# Patient Record
Sex: MALE | Race: BLACK OR AFRICAN AMERICAN | Employment: FULL TIME | URBAN - METROPOLITAN AREA
[De-identification: names, ages, dates, MRNs, and addresses within clinical notes are randomized per-mention and may not be internally consistent; named-entity substitution may affect disease eponyms.]

---

## 2018-07-15 ENCOUNTER — HOSPITAL ENCOUNTER (EMERGENCY)
Age: 23
Discharge: HOME | End: 2018-07-15
Attending: EMERGENCY MEDICINE
Payer: COMMERCIAL

## 2018-07-15 VITALS
SYSTOLIC BLOOD PRESSURE: 140 MMHG | DIASTOLIC BLOOD PRESSURE: 79 MMHG | BODY MASS INDEX: 37.24 KG/M2 | HEART RATE: 81 BPM | HEIGHT: 73 IN | TEMPERATURE: 97.6 F | WEIGHT: 281 LBS | OXYGEN SATURATION: 100 %

## 2018-07-15 DIAGNOSIS — B20 HUMAN IMMUNODEFICIENCY VIRUS (HIV) DISEASE (HCC): ICD-10-CM

## 2018-07-15 DIAGNOSIS — R19.7 DIARRHEA, UNSPECIFIED TYPE: Primary | ICD-10-CM

## 2018-07-15 DIAGNOSIS — G43.809 OTHER MIGRAINE WITHOUT STATUS MIGRAINOSUS, NOT INTRACTABLE: ICD-10-CM

## 2018-07-15 PROCEDURE — 99283 EMERGENCY DEPT VISIT LOW MDM: CPT

## 2018-07-15 PROCEDURE — 6370000000 HC RX 637 (ALT 250 FOR IP): Performed by: EMERGENCY MEDICINE

## 2018-07-15 RX ORDER — BUTALBITAL, ACETAMINOPHEN AND CAFFEINE 50; 325; 40 MG/1; MG/1; MG/1
1 TABLET ORAL ONCE
Status: COMPLETED | OUTPATIENT
Start: 2018-07-15 | End: 2018-07-15

## 2018-07-15 RX ADMIN — BUTALBITAL, ACETAMINOPHEN, AND CAFFEINE 1 TABLET: 50; 325; 40 TABLET ORAL at 09:10

## 2018-07-15 ASSESSMENT — PAIN SCALES - GENERAL
PAINLEVEL_OUTOF10: 4
PAINLEVEL_OUTOF10: 4

## 2018-07-15 ASSESSMENT — PAIN DESCRIPTION - LOCATION: LOCATION: HEAD

## 2018-07-15 ASSESSMENT — PAIN DESCRIPTION - PAIN TYPE: TYPE: ACUTE PAIN

## 2018-07-15 ASSESSMENT — PAIN DESCRIPTION - DESCRIPTORS: DESCRIPTORS: PRESSURE

## 2018-07-15 NOTE — ED NOTES
Patient given d/c instructions with return verbalization including medication. Emphasis on f/u, to return with worsening s/s. Pt ambulated to lobby with steady gait. Work note given to patient.      Mikhail Carlton RN  07/15/18 6091

## 2019-12-16 ENCOUNTER — HOSPITAL ENCOUNTER (EMERGENCY)
Age: 24
Discharge: HOME | End: 2019-12-16
Attending: EMERGENCY MEDICINE
Payer: COMMERCIAL

## 2019-12-16 ENCOUNTER — APPOINTMENT (OUTPATIENT)
Dept: GENERAL RADIOLOGY | Age: 24
End: 2019-12-16
Payer: COMMERCIAL

## 2019-12-16 VITALS
BODY MASS INDEX: 36.45 KG/M2 | WEIGHT: 275 LBS | HEART RATE: 96 BPM | SYSTOLIC BLOOD PRESSURE: 148 MMHG | RESPIRATION RATE: 16 BRPM | OXYGEN SATURATION: 99 % | HEIGHT: 73 IN | DIASTOLIC BLOOD PRESSURE: 91 MMHG | TEMPERATURE: 98.5 F

## 2019-12-16 DIAGNOSIS — J06.9 VIRAL URI WITH COUGH: ICD-10-CM

## 2019-12-16 DIAGNOSIS — R05.9 COUGH: Primary | ICD-10-CM

## 2019-12-16 PROCEDURE — 99283 EMERGENCY DEPT VISIT LOW MDM: CPT

## 2019-12-16 PROCEDURE — 71046 X-RAY EXAM CHEST 2 VIEWS: CPT

## 2019-12-16 RX ORDER — IBUPROFEN 600 MG/1
600 TABLET ORAL EVERY 6 HOURS PRN
Qty: 30 TABLET | Refills: 0 | Status: SHIPPED | OUTPATIENT
Start: 2019-12-16 | End: 2020-01-18

## 2019-12-16 RX ORDER — GUAIFENESIN AND DEXTROMETHORPHAN HYDROBROMIDE 1200; 60 MG/1; MG/1
1 TABLET, EXTENDED RELEASE ORAL EVERY 12 HOURS PRN
Qty: 20 TABLET | Refills: 0 | Status: SHIPPED | OUTPATIENT
Start: 2019-12-16 | End: 2021-05-04

## 2019-12-16 ASSESSMENT — PAIN DESCRIPTION - DESCRIPTORS: DESCRIPTORS: ACHING

## 2019-12-16 ASSESSMENT — PAIN SCALES - GENERAL
PAINLEVEL_OUTOF10: 4
PAINLEVEL_OUTOF10: 0

## 2019-12-16 ASSESSMENT — PAIN DESCRIPTION - PAIN TYPE: TYPE: ACUTE PAIN

## 2019-12-16 ASSESSMENT — PAIN DESCRIPTION - LOCATION: LOCATION: CHEST

## 2020-01-18 ENCOUNTER — HOSPITAL ENCOUNTER (EMERGENCY)
Age: 25
Discharge: HOME | End: 2020-01-18
Attending: EMERGENCY MEDICINE
Payer: COMMERCIAL

## 2020-01-18 VITALS
DIASTOLIC BLOOD PRESSURE: 83 MMHG | HEART RATE: 84 BPM | HEIGHT: 73 IN | BODY MASS INDEX: 35.2 KG/M2 | TEMPERATURE: 98.1 F | SYSTOLIC BLOOD PRESSURE: 138 MMHG | RESPIRATION RATE: 18 BRPM | WEIGHT: 265.56 LBS

## 2020-01-18 LAB — D DIMER: <200 NG/ML DDU (ref 0–229)

## 2020-01-18 PROCEDURE — 85379 FIBRIN DEGRADATION QUANT: CPT

## 2020-01-18 PROCEDURE — 99283 EMERGENCY DEPT VISIT LOW MDM: CPT

## 2020-01-18 RX ORDER — AMLODIPINE BESYLATE 10 MG/1
10 TABLET ORAL
COMMUNITY
Start: 2018-02-12

## 2020-01-18 RX ORDER — HYDROCHLOROTHIAZIDE 12.5 MG/1
12.5 CAPSULE, GELATIN COATED ORAL
COMMUNITY
Start: 2018-03-14

## 2020-01-18 RX ORDER — METFORMIN HYDROCHLORIDE 500 MG/1
TABLET, EXTENDED RELEASE ORAL
COMMUNITY
Start: 2019-08-01

## 2020-01-18 RX ORDER — IBUPROFEN 600 MG/1
600 TABLET ORAL EVERY 6 HOURS PRN
Qty: 30 TABLET | Refills: 0 | Status: SHIPPED | OUTPATIENT
Start: 2020-01-18 | End: 2021-05-04

## 2020-01-18 RX ORDER — LANOLIN ALCOHOL/MO/W.PET/CERES
3 CREAM (GRAM) TOPICAL
COMMUNITY
Start: 2019-07-23

## 2020-01-18 ASSESSMENT — PAIN SCALES - GENERAL: PAINLEVEL_OUTOF10: 4

## 2020-01-18 ASSESSMENT — PAIN DESCRIPTION - LOCATION: LOCATION: LEG

## 2020-01-18 ASSESSMENT — PAIN DESCRIPTION - ORIENTATION: ORIENTATION: RIGHT

## 2020-01-18 ASSESSMENT — PAIN DESCRIPTION - PAIN TYPE: TYPE: ACUTE PAIN

## 2020-01-18 ASSESSMENT — PAIN DESCRIPTION - DESCRIPTORS: DESCRIPTORS: ACHING

## 2020-01-18 NOTE — ED PROVIDER NOTES
needed (for cough) 20 tablet 0    hydrocortisone 2.5 % cream Apply topically 2 times daily. 30 g 1       Social History     Tobacco Use    Smoking status: Never Smoker    Smokeless tobacco: Never Used   Substance Use Topics    Alcohol use: Yes     Comment: rarely    Drug use: No         EXAM:   Presentation Vital Signs: /83   Pulse 84   Temp 98.1 °F (36.7 °C)   Resp 18   Ht 6' 1\" (1.854 m)   Wt 120.5 kg (265 lb 9 oz)   BMI 35.04 kg/m²   General: Well nourished, no acute distress  Head: No traumatic injury  ENT: MMM, no facial asymmetry, no nasal discharge  Eyes: EOM  Neck: No tracheal deviation or stridor  Skin: no pallor, erythema, lesions or other abnormalities on exposed skin of face and arms  Extremities: L calf and R medial inner thigh tthere is pain with palpation  Neurologic: Alert, oriented x 3. No focal deficits upon moving arms and legs  Psychiatric: Appropriate demeanor without agitation or internal stimulation      MEDICAL DECISION MAKING  Pt here with isolated R LLE pain, spontaneous. Low risk for DVT but certainly signs and symptoms to some extent. D-dimer neg    Dc home with analgesics. States leg hurts more wearing a certain pair of shoes--perhaps he has some muscle strain from poor posture. Images reviewed, patient counseled, and knows to follow up with orthopedics in 1 week or less, especially if symptoms persist, and to follow up sooner if they worsen. DISPOSITION  Home    IMPRESSION:  R leg strain      This medical chart used with aid of transcription software. As such, there may be inadvertent errors in transcription of spellings and words despite physician's attempts to correct all possible errors.          Jeanmarie Concepcion MD  01/18/20 1830

## 2020-05-16 ENCOUNTER — HOSPITAL ENCOUNTER (EMERGENCY)
Age: 25
Discharge: HOME | End: 2020-05-16
Attending: EMERGENCY MEDICINE
Payer: COMMERCIAL

## 2020-05-16 VITALS
RESPIRATION RATE: 16 BRPM | SYSTOLIC BLOOD PRESSURE: 140 MMHG | HEART RATE: 67 BPM | BODY MASS INDEX: 37.52 KG/M2 | WEIGHT: 284.39 LBS | OXYGEN SATURATION: 96 % | TEMPERATURE: 98.5 F | DIASTOLIC BLOOD PRESSURE: 68 MMHG

## 2020-05-16 LAB
A/G RATIO: 1.4 (ref 1.1–2.2)
ALBUMIN SERPL-MCNC: 4.5 G/DL (ref 3.4–5)
ALP BLD-CCNC: 64 U/L (ref 40–129)
ALT SERPL-CCNC: 39 U/L (ref 10–40)
ANION GAP SERPL CALCULATED.3IONS-SCNC: 13 MMOL/L (ref 3–16)
AST SERPL-CCNC: 20 U/L (ref 15–37)
BASOPHILS ABSOLUTE: 0.1 K/UL (ref 0–0.2)
BASOPHILS RELATIVE PERCENT: 0.6 %
BILIRUB SERPL-MCNC: <0.2 MG/DL (ref 0–1)
BUN BLDV-MCNC: 14 MG/DL (ref 7–20)
CALCIUM SERPL-MCNC: 9.5 MG/DL (ref 8.3–10.6)
CHLORIDE BLD-SCNC: 98 MMOL/L (ref 99–110)
CO2: 25 MMOL/L (ref 21–32)
CREAT SERPL-MCNC: 1.1 MG/DL (ref 0.9–1.3)
EOSINOPHILS ABSOLUTE: 0 K/UL (ref 0–0.6)
EOSINOPHILS RELATIVE PERCENT: 0.2 %
GFR AFRICAN AMERICAN: >60
GFR NON-AFRICAN AMERICAN: >60
GLOBULIN: 3.2 G/DL
GLUCOSE BLD-MCNC: 237 MG/DL (ref 70–99)
HCT VFR BLD CALC: 44.9 % (ref 40.5–52.5)
HEMOGLOBIN: 14.8 G/DL (ref 13.5–17.5)
LYMPHOCYTES ABSOLUTE: 2.1 K/UL (ref 1–5.1)
LYMPHOCYTES RELATIVE PERCENT: 26.3 %
MAGNESIUM: 1.7 MG/DL (ref 1.8–2.4)
MCH RBC QN AUTO: 26.3 PG (ref 26–34)
MCHC RBC AUTO-ENTMCNC: 32.9 G/DL (ref 31–36)
MCV RBC AUTO: 79.9 FL (ref 80–100)
MONOCYTES ABSOLUTE: 0.5 K/UL (ref 0–1.3)
MONOCYTES RELATIVE PERCENT: 6.8 %
NEUTROPHILS ABSOLUTE: 5.2 K/UL (ref 1.7–7.7)
NEUTROPHILS RELATIVE PERCENT: 66.1 %
PDW BLD-RTO: 13.6 % (ref 12.4–15.4)
PLATELET # BLD: 274 K/UL (ref 135–450)
PMV BLD AUTO: 7.9 FL (ref 5–10.5)
POTASSIUM REFLEX MAGNESIUM: 3.1 MMOL/L (ref 3.5–5.1)
RBC # BLD: 5.63 M/UL (ref 4.2–5.9)
SODIUM BLD-SCNC: 136 MMOL/L (ref 136–145)
TOTAL PROTEIN: 7.7 G/DL (ref 6.4–8.2)
WBC # BLD: 7.9 K/UL (ref 4–11)

## 2020-05-16 PROCEDURE — 83735 ASSAY OF MAGNESIUM: CPT

## 2020-05-16 PROCEDURE — 85025 COMPLETE CBC W/AUTO DIFF WBC: CPT

## 2020-05-16 PROCEDURE — 80053 COMPREHEN METABOLIC PANEL: CPT

## 2020-05-16 PROCEDURE — 6370000000 HC RX 637 (ALT 250 FOR IP): Performed by: EMERGENCY MEDICINE

## 2020-05-16 PROCEDURE — 99283 EMERGENCY DEPT VISIT LOW MDM: CPT

## 2020-05-16 RX ORDER — ONDANSETRON 4 MG/1
4 TABLET, ORALLY DISINTEGRATING ORAL ONCE
Status: COMPLETED | OUTPATIENT
Start: 2020-05-16 | End: 2020-05-16

## 2020-05-16 RX ADMIN — ONDANSETRON 4 MG: 4 TABLET, ORALLY DISINTEGRATING ORAL at 04:08

## 2020-05-16 NOTE — ED NOTES
Pt state that he took three edibles a couple hours prior to arrival. He comes in stating that he has cotton mouth and is lightheaded. He states that he just doesn't feel like he is in his body. He denies pain. He is alert and orientedx4.       Cyn Goode RN  05/16/20 8913

## 2020-05-16 NOTE — ED PROVIDER NOTES
emtricitabine-rilpivirine-tenofovir alafenamide (ODEFSEY) 417-86-82 MG TABS per tablet Take 1 tablet by mouth daily         ALLERGIES    No Known Allergies    FAMILY HISTORY    History reviewed. No pertinent family history.     SOCIAL HISTORY    Social History     Socioeconomic History    Marital status: Single     Spouse name: None    Number of children: None    Years of education: None    Highest education level: None   Occupational History    None   Social Needs    Financial resource strain: None    Food insecurity     Worry: None     Inability: None    Transportation needs     Medical: None     Non-medical: None   Tobacco Use    Smoking status: Never Smoker    Smokeless tobacco: Never Used   Substance and Sexual Activity    Alcohol use: Yes     Comment: rarely    Drug use: Yes     Types: Marijuana     Comment: once on 5/16/2020    Sexual activity: Never   Lifestyle    Physical activity     Days per week: None     Minutes per session: None    Stress: None   Relationships    Social connections     Talks on phone: None     Gets together: None     Attends Gnosticist service: None     Active member of club or organization: None     Attends meetings of clubs or organizations: None     Relationship status: None    Intimate partner violence     Fear of current or ex partner: None     Emotionally abused: None     Physically abused: None     Forced sexual activity: None   Other Topics Concern    None   Social History Narrative    None       REVIEW OF SYSTEMS    Constitutional:  Denies fever, chills, weight loss or weakness   Eyes:  Denies photophobia or discharge   HENT:  Denies sore throat or ear pain   Respiratory:  Denies cough or shortness of breath   Cardiovascular:  Denies chest pain, palpitations or swelling   GI:  Denies abdominal pain, but he had some nausea and vomiting at home musculoskeletal:  Denies back pain   Skin:  Denies rash   Neurologic:  Denies headache, focal weakness or sensory changes   Endocrine:  Denies polyuria or polydypsia   Lymphatic:  Denies swollen glands   Psychiatric:  Denies depression, suicidal ideation or homicidal ideation   All systems negative except as marked. PHYSICAL EXAM    VITAL SIGNS: /70   Pulse 104   Temp 98.5 °F (36.9 °C) (Oral)   Resp 16   Wt 284 lb 6.3 oz (129 kg)   SpO2 96%   BMI 37.52 kg/m²    Constitutional:  Well developed, Well nourished, No acute distress, Non-toxic appearance. HENT:  Normocephalic, Atraumatic, Bilateral external ears normal, Oropharynx moist, No oral exudates, Nose normal. Neck- Normal range of motion, No tenderness, Supple, No stridor. Eyes:  PERRL, EOMI, Conjunctiva normal, No discharge. Respiratory:  Normal breath sounds, No respiratory distress, No wheezing, No chest tenderness. Cardiovascular:  Normal heart rate, Normal rhythm, No murmurs, No rubs, No gallops. GI:  Bowel sounds normal, Soft, No tenderness, No masses, No pulsatile masses. Musculoskeletal:  Intact distal pulses, No edema, No tenderness, No cyanosis, No clubbing. Good range of motion in all major joints. No tenderness to palpation or major deformities noted. Back- No tenderness. Integument:  Warm, Dry, No erythema, No rash. Lymphatic:  No lymphadenopathy noted. Neurologic:  Alert & oriented x 3, Normal motor function, Normal sensory function, No focal deficits noted.    Psychiatric:  Affect normal, Judgment normal, Mood normal.     EKG        RADIOLOGY        PROCEDURES    Labs Reviewed   CBC WITH AUTO DIFFERENTIAL - Abnormal; Notable for the following components:       Result Value    MCV 79.9 (*)     All other components within normal limits    Narrative:     Performed at:  [address] [address]   Phone [phone]   COMPREHENSIVE METABOLIC PANEL W/ REFLEX TO MG FOR LOW K - Abnormal; Notable for the following components:    Potassium reflex Magnesium 3.1 (*)

## 2020-05-29 ENCOUNTER — HOSPITAL ENCOUNTER (EMERGENCY)
Age: 25
Discharge: HOME | End: 2020-05-29
Payer: COMMERCIAL

## 2020-05-29 VITALS
RESPIRATION RATE: 16 BRPM | TEMPERATURE: 97.4 F | HEART RATE: 83 BPM | WEIGHT: 271.5 LBS | OXYGEN SATURATION: 99 % | SYSTOLIC BLOOD PRESSURE: 140 MMHG | BODY MASS INDEX: 35.98 KG/M2 | DIASTOLIC BLOOD PRESSURE: 91 MMHG | HEIGHT: 73 IN

## 2020-05-29 PROCEDURE — 99283 EMERGENCY DEPT VISIT LOW MDM: CPT

## 2020-05-29 ASSESSMENT — ENCOUNTER SYMPTOMS
ABDOMINAL PAIN: 0
VOMITING: 1
BLOOD IN STOOL: 0
SHORTNESS OF BREATH: 0
CONSTIPATION: 0
NAUSEA: 1
RHINORRHEA: 0
SORE THROAT: 0
DIARRHEA: 0

## 2020-05-29 ASSESSMENT — PAIN SCALES - GENERAL: PAINLEVEL_OUTOF10: 1

## 2020-05-29 ASSESSMENT — PAIN DESCRIPTION - PAIN TYPE: TYPE: ACUTE PAIN

## 2020-05-29 ASSESSMENT — PAIN DESCRIPTION - LOCATION: LOCATION: HEAD

## 2020-07-10 ENCOUNTER — APPOINTMENT (OUTPATIENT)
Dept: GENERAL RADIOLOGY | Age: 25
End: 2020-07-10
Payer: COMMERCIAL

## 2020-07-10 ENCOUNTER — HOSPITAL ENCOUNTER (EMERGENCY)
Age: 25
Discharge: HOME | End: 2020-07-10
Payer: COMMERCIAL

## 2020-07-10 VITALS
HEART RATE: 96 BPM | WEIGHT: 269.62 LBS | OXYGEN SATURATION: 96 % | RESPIRATION RATE: 17 BRPM | DIASTOLIC BLOOD PRESSURE: 84 MMHG | TEMPERATURE: 97.4 F | HEIGHT: 73 IN | SYSTOLIC BLOOD PRESSURE: 133 MMHG | BODY MASS INDEX: 35.73 KG/M2

## 2020-07-10 LAB — SARS-COV-2, PCR: DETECTED

## 2020-07-10 PROCEDURE — U0002 COVID-19 LAB TEST NON-CDC: HCPCS

## 2020-07-10 PROCEDURE — U0003 INFECTIOUS AGENT DETECTION BY NUCLEIC ACID (DNA OR RNA); SEVERE ACUTE RESPIRATORY SYNDROME CORONAVIRUS 2 (SARS-COV-2) (CORONAVIRUS DISEASE [COVID-19]), AMPLIFIED PROBE TECHNIQUE, MAKING USE OF HIGH THROUGHPUT TECHNOLOGIES AS DESCRIBED BY CMS-2020-01-R: HCPCS

## 2020-07-10 PROCEDURE — 99283 EMERGENCY DEPT VISIT LOW MDM: CPT

## 2020-07-10 PROCEDURE — 71045 X-RAY EXAM CHEST 1 VIEW: CPT

## 2020-07-10 ASSESSMENT — PAIN DESCRIPTION - ORIENTATION: ORIENTATION: OTHER (COMMENT)

## 2020-07-10 ASSESSMENT — PAIN DESCRIPTION - LOCATION: LOCATION: GENERALIZED

## 2020-07-10 ASSESSMENT — PAIN DESCRIPTION - DESCRIPTORS: DESCRIPTORS: ACHING

## 2020-07-10 ASSESSMENT — PAIN SCALES - GENERAL: PAINLEVEL_OUTOF10: 2

## 2020-07-10 ASSESSMENT — PAIN DESCRIPTION - PAIN TYPE: TYPE: ACUTE PAIN

## 2020-07-10 ASSESSMENT — PAIN DESCRIPTION - FREQUENCY: FREQUENCY: CONTINUOUS

## 2020-07-10 NOTE — ED NOTES
Pt states that he has been having body aches and fever for the last few days, he has a room mate and states that his room mate has had the same symptoms. Pt concerned about covid. Pt is currently afebrile. No N/V/D, no shortness of breath.       [address] Melina, RN  07/10/20 3829

## 2020-07-10 NOTE — ED PROVIDER NOTES
(122.3 kg)   SpO2 96%   BMI 35.57 kg/m²   Constitutional:  Well developed, well nourished, no acute distress  Eyes: Sclera nonicteric, conjunctiva normal   Ears: external ears normal, TM's clear bilaterally  Throat/Face:  no exudate or edema of the throat, no trismus, moist mucus membranes  Neck: Supple, no enlarged tonsillar lymphadenopathy  Respiratory:  Lungs clear to auscultation bilaterally, no retractions, speaking in complete sentences, O2 saturation 96% on room air  Cardiovascular:  regular rate, regular rhythm  GI: soft, nontender, nondistended  Neurologic: Awake, alert and oriented, no slurred speech  Integument: Skin is warm and dry, no rash    RADIOLOGY/PROCEDURES    XR CHEST PORTABLE   Final Result   No evidence of pneumonia             ED COURSE & MEDICAL DECISION MAKING    See chart for details of medications given. Vitals:    07/10/20 1323 07/10/20 1502 07/10/20 1503   BP: 135/86  133/84   Pulse: 112 96    Resp: 17     Temp: 97.4 °F (36.3 °C)     TempSrc: Oral     SpO2: 98% 96%    Weight: 269 lb 10 oz (122.3 kg)     Height: 6' 1\" (1.854 m)       Differential diagnoses: Influenza, Other viral illness, Meningitis, Group A strep, Airway Obstruction, Pneumonia, Hypoxemia, Dehydration, other. Patient presenting without fever, nontoxic in appearance. COVID test pending. Chest x-ray as above, shows no evidence of pneumonia. The patient was specifically advised their symptoms are consistent with possible COVID-19 infection, and they need to self-isolate at home and restrict any activities outside of their home except for seeking medical care, and to wear a facemask whenever around others. The patient was provided specific instructions related to COVID-19, along with recommendations for proper respiratory hygiene and instructions on prevention of transmission of infection to others.     Management decisions relating to this patient encounter were made during the [facility] health emergency. As a result, admission to the hospital and further ED observation/treatment pose increased risk due to multiple factors. At this point in time, the risk of hospital admission or further ED observation/treatment of this patient is deemed to be greater than the risk of discharge. I engaged in a shared decision-making conversation with the patient. The patient agrees and wishes to go home. The patient was counseled to closely monitor their symptoms, and to return immediately to the Emergency Department for any difficulty breathing, confusion, dizziness or passing out, vomiting, chest pain, high fevers, weakness, or any other new or concerning symptoms. There is no evidence of emergent medical condition at this time, and the patient will be discharged in good condition. The patient was instructed to follow up as an outpatient in 2 days. The patient was instructed to return to the ED immediately for any new or worsening symptoms. The patient verbalized understanding. FINAL IMPRESSION    1.  Suspected COVID-19 virus infection        PLAN  Outpatient medications, followup, and discharge instructions (see EMR)      (Please note that this note was completed with a voice recognition program.  Every attempt was made to edit the dictations, but inevitably there remain words that are mis-transcribed.)           SHEEBA Julio  07/10/20 Gerardo [address] [place] [address]  07/10/20 0996

## 2020-07-11 ENCOUNTER — CARE COORDINATION (OUTPATIENT)
Dept: CARE COORDINATION | Age: 25
End: 2020-07-11

## 2020-07-11 NOTE — CARE COORDINATION
Patient contacted regarding [id number] diagnosis\". Discussed COVID-19 related testing which was available at this time. Test results were positive. Patient informed of results, if available? Yes    Care Transition Nurse/ Ambulatory Care Manager contacted the patient by telephone to perform post discharge assessment. Verified name and  with patient as identifiers. Provided introduction to self, and explanation of the CTN/ACM role, and reason for call due to risk factors for infection and/or exposure to COVID-19. Symptoms reviewed with patient who verbalized the following symptoms: fever, fatigue, pain or aching joints, cough, chills or shaking, sweating, dizziness/lightheadedness, no new symptoms, no worsening symptoms and headache and loss of smell. Due to no new or worsening symptoms encounter was not routed to provider for escalation. Discussed follow-up appointments. If no appointment was previously scheduled, appointment scheduling offered: Yes    Non-[facility] follow up appointment(s):  PCP and ID physician d/t his HIV history. The RN, ACM instructed the pt to make virtual appointments with both his PCP and ID physician. The pt stated he was also scheduled to have a cavity repair by his dentist on 2020. The RN, ACM instructed the pt to call and notified the dentist to reschedule after the pt is re-tested. The RN, ACM encouraged the pt to sign up for [email or web address] to be able to obtain his lab test results for his work. The pt stated he was given a code and he will sign up. Pt taking an OTC multiple symptom cold medication and stated it has been helping. Pt encouraged to rest, stay hydrated and try to eat at least small meals.      Patient has following risk factors of: immunocompromised, diabetes and HIV and HTN. CTN/ACM reviewed discharge instructions, medical action plan and red flags such as increased shortness of breath, increasing fever and signs of decompensation with patient who verbalized understanding. Discussed exposure protocols and quarantine with CDC Guidelines What to do if you are sick with coronavirus disease 2019.  Patient was given an opportunity for questions and concerns. The patient agrees to contact the [facility] exposure line [phone], local [facility] department Critical access hospital: ([phone]) and PCP office for questions related to their healthcare. CTN/ACM provided contact information for future needs. Reviewed and educated patient on any new and changed medications related to discharge diagnosis     Patient/family/caregiver given information for Get[employer] Loop and agrees to enroll yes  Patient's preferred e-mail: [email or web address]   Patient's preferred phone number: [phone]  Based on Loop alert triggers, patient will be contacted by nurse care manager for worsening symptoms. Pt will be further monitored by COVID Loop Team based on severity of symptoms and risk factors.

## 2020-07-13 ENCOUNTER — NURSE TRIAGE (OUTPATIENT)
Dept: OTHER | Facility: CLINIC | Age: 25
End: 2020-07-13

## 2020-07-13 ENCOUNTER — CARE COORDINATION (OUTPATIENT)
Dept: CASE MANAGEMENT | Age: 25
End: 2020-07-13

## 2020-07-13 NOTE — CARE COORDINATION
Date/Time:  7/13/2020 3:27 PM  LPN attempted to reach patient by telephone regarding yellow alert in Loop remote symptom monitoring program. Left HIPPA compliant message requesting a return call. Will attempt to reach patient again. Comment left in Loop monitoring program with contact information. Arthur Allen LPN, 6:35 PM  Hi, I'm Huber Paulino LPN from the [address] [place], [place] [address] [facility] Care Team. I see you triggered an alert earlier. I called and left you a message to call to discuss your symptoms. If you are still having problems, either call me at [phone] or you can send an E-mail. I am available between 8 am to 4 pm Mon-Fri. and 9 am to 1 pm weekends. If your symptoms worsen or are severe - Please call your doctor / call 391 and/or go to the nearest Emergency Room. Waiting for Patient Reply. Arthur Allen, 8:13 AM 7/14/2020  Patient did not respond to Loop E-mail or Telephone Call.     Arthur Allen LPN     [address] [facility] / [facility]

## 2020-07-22 ENCOUNTER — CARE COORDINATION (OUTPATIENT)
Dept: CASE MANAGEMENT | Age: 25
End: 2020-07-22

## 2020-07-22 NOTE — CARE COORDINATION
Date/Time:  7/22/2020 9:14 AM  LPN attempted to reach patient by telephone regarding comment in Loop remote symptom monitoring program. Left HIPPA compliant message requesting a return call. Will attempt to reach patient again. Comment left in Loop monitoring program with contact information. Patient called and left message asking about going back to work. Mayte Oneil LPN, 1:93 AM Replied to patient  Hi, I tried to call you back and I left you a message. You have to follow-up with your family doctor. Also find out from your work if they will need you to be tested again (sometimes you need 1-2 negative covid tests to return to work). You were tested on 7/10/2020 so 14 days is July 24th.  MARCIA Salas LPN     [facility] / [facility]

## 2020-12-31 ENCOUNTER — HOSPITAL ENCOUNTER (EMERGENCY)
Age: 25
Discharge: HOME | End: 2020-12-31
Attending: EMERGENCY MEDICINE
Payer: COMMERCIAL

## 2020-12-31 VITALS
WEIGHT: 269 LBS | OXYGEN SATURATION: 100 % | TEMPERATURE: 98.2 F | HEART RATE: 77 BPM | HEIGHT: 73 IN | SYSTOLIC BLOOD PRESSURE: 150 MMHG | BODY MASS INDEX: 35.65 KG/M2 | RESPIRATION RATE: 14 BRPM | DIASTOLIC BLOOD PRESSURE: 84 MMHG

## 2020-12-31 DIAGNOSIS — L02.91 ABSCESS: Primary | ICD-10-CM

## 2020-12-31 PROCEDURE — 99283 EMERGENCY DEPT VISIT LOW MDM: CPT

## 2020-12-31 PROCEDURE — 6370000000 HC RX 637 (ALT 250 FOR IP): Performed by: EMERGENCY MEDICINE

## 2020-12-31 RX ORDER — SULFAMETHOXAZOLE AND TRIMETHOPRIM 800; 160 MG/1; MG/1
1 TABLET ORAL 2 TIMES DAILY
Qty: 14 TABLET | Refills: 0 | Status: SHIPPED | OUTPATIENT
Start: 2020-12-31 | End: 2021-01-07

## 2020-12-31 RX ORDER — SULFAMETHOXAZOLE AND TRIMETHOPRIM 800; 160 MG/1; MG/1
1 TABLET ORAL ONCE
Status: COMPLETED | OUTPATIENT
Start: 2020-12-31 | End: 2020-12-31

## 2020-12-31 RX ADMIN — SULFAMETHOXAZOLE AND TRIMETHOPRIM 1 TABLET: 800; 160 TABLET ORAL at 08:39

## 2020-12-31 NOTE — ED NOTES
Patient given prescription, work note, discharge instructions verbal and written, patient verbalized understanding. Alert/oriented X4, Clear speech.   Patient exhibits no distress, ambulates with steady gait per self leaving unit, no further request.     Mayo Francisco RN  12/31/20 1484

## 2020-12-31 NOTE — ED NOTES
Patient to ed with complaints of a abscess on his left jaw/face which started 2-3 days ago after shaving, area is not draining.      Cici Rogel RN  12/31/20 2030

## 2020-12-31 NOTE — ED PROVIDER NOTES
[address] [facility]  eMERGENCY dEPARTMENT eNCOUnter      Pt Name: Rboel Rios  MRN: [id number]  [place] 1995  Date of evaluation: 12/31/2020  Provider: Nelda Dailey MD    [address] [place] [address]       Chief Complaint   Patient presents with    Abscess     left face         HISTORY OF PRESENT ILLNESS   (Location/Symptom, Timing/Onset, Context/Setting, Quality, Duration, Modifying Factors, Severity)  Note limiting factors. Robel Rios is a 30 y.o. male with hx of HIV being treated with antiretroviral therapy with a helper CD4 count of 384 who presents with 2 days of a painful sore to his left outer cheek. Patient has any fever, trouble breathing, trouble swallowing, trouble handling oral secretions. Reports symptoms are mild, constant, and worsening. Reports that it started after shaving. He denies any active drainage or red streaking. \[facility] [place] [facility]\""    Nursing Notes were reviewed. REVIEW OFSYSTEMS    (2-9 systems for level 4, 10 or more for level 5)     Review of Systems   Constitutional: Negative for fever. HENT: Negative for drooling, trouble swallowing and voice change. Eyes: Negative for visual disturbance. Respiratory: Negative for shortness of breath and wheezing. Cardiovascular: Negative for chest pain and palpitations. Neurological: Negative for seizures and syncope. Psychiatric/Behavioral: Negative for self-injury and suicidal ideas. Except as noted above the remainder of the review of systems was reviewed and negative. PAST MEDICAL HISTORY     Past Medical History:   Diagnosis Date    Depression     HIV (human immunodeficiency virus infection) ([facility] [address]     Hypertension     MRSA (methicillin resistant staph aureus) culture positive 06/06/2018    leg         SURGICAL HISTORY     History reviewed. No pertinent surgical history.       CURRENT MEDICATIONS       Previous Medications    AMLODIPINE (NORVASC) 10 MG TABLET    Take 10 mg by mouth current or ex partner: None     Emotionally abused: None     Physically abused: None     Forced sexual activity: None   Other Topics Concern    None   Social History Narrative    None         PHYSICAL EXAM    (up to 7 for level 4, 8 or more for level 5)     ED Triage Vitals [12/31/20 0751]   BP Temp Temp src Pulse Resp SpO2 Height Weight   (!) 150/84 98.2 °F (36.8 °C) -- 77 14 100 % 6' 1\" (1.854 m) 269 lb (122 kg)       Physical Exam  Vitals signs and nursing note reviewed. Constitutional:       General: He is not in acute distress. Appearance: He is well-developed. Comments: Pleasant and cooperative. Nontoxic-appearing. In no acute distress. HENT:      Head: Normocephalic and atraumatic. Mouth/Throat:      Comments: 1 cm circular sore to the left outer cheek concerning for possible early developing abscess. No communication with intraoral mouth. Uvula midline. No fluctuance of floor the mouth or lip of the tongue. Patient breathing normally, speaking normally, no signs of impending airway obstruction. Eyes:      Conjunctiva/sclera: Conjunctivae normal.   Neck:      Vascular: No JVD. Trachea: No tracheal deviation. Cardiovascular:      Rate and Rhythm: Normal rate. Pulmonary:      Effort: Pulmonary effort is normal. No respiratory distress. Skin:     General: Skin is warm and dry. Neurological:      Mental Status: He is alert. EMERGENCY DEPARTMENT COURSE and DIFFERENTIAL DIAGNOSIS/MDM:   Vitals:    Vitals:    12/31/20 0751   BP: (!) 150/84   Pulse: 77   Resp: 14   Temp: 98.2 °F (36.8 °C)   SpO2: 100%   Weight: 269 lb (122 kg)   Height: 6' 1\" (1.854 m)         MDM  Patient afebrile, nontoxic-appearing, no acute distress. No signs of impending airway obstruction. I feel the patient is appropriate for warm compresses and p.o. Bactrim.   There is no current fluctuance or drainage noted at the incision and drainage is indicated at this time however I given the patient strict ER return precautions for worsening of his symptoms which might require incision and drainage in the future. Outpatient follow-up is recommended. The patient expresses understanding and agreement with this plan and is discharged home. Procedures    FINAL IMPRESSION      1. Abscess          DISPOSITION/PLAN   DISPOSITION Decision To Discharge 12/31/2020 08:25:29 AM      PATIENT REFERRED TO:  Jazmine Howard MD  [address]  [facility]  [phone]    In 1 week      [address] Emergency Department  [address] [place] [address]  [phone]    If symptoms worsen      DISCHARGE MEDICATIONS:  New Prescriptions    SULFAMETHOXAZOLE-TRIMETHOPRIM (BACTRIM DS) 800-160 MG PER TABLET    Take 1 tablet by mouth 2 times daily for 7 days          (Please note that portions of this note were completed with a voice recognition program.  Efforts were made to edit the dictations but occasionally words aremis-transcribed. )    Delbert Lutz MD (electronically signed)  Attending Emergency Physician            Delbert Lutz MD  12/31/20 4932

## 2021-01-04 ENCOUNTER — HOSPITAL ENCOUNTER (EMERGENCY)
Age: 26
Discharge: HOME | End: 2021-01-04
Attending: EMERGENCY MEDICINE
Payer: COMMERCIAL

## 2021-01-04 ENCOUNTER — APPOINTMENT (OUTPATIENT)
Dept: GENERAL RADIOLOGY | Age: 26
End: 2021-01-04
Payer: COMMERCIAL

## 2021-01-04 VITALS
HEART RATE: 97 BPM | DIASTOLIC BLOOD PRESSURE: 86 MMHG | BODY MASS INDEX: 36.51 KG/M2 | RESPIRATION RATE: 17 BRPM | TEMPERATURE: 98.3 F | WEIGHT: 276.7 LBS | OXYGEN SATURATION: 100 % | SYSTOLIC BLOOD PRESSURE: 136 MMHG

## 2021-01-04 DIAGNOSIS — L02.01 FACIAL ABSCESS: Primary | ICD-10-CM

## 2021-01-04 DIAGNOSIS — K12.0 CANKER SORES ORAL: ICD-10-CM

## 2021-01-04 DIAGNOSIS — L03.012 CELLULITIS OF LEFT MIDDLE FINGER: ICD-10-CM

## 2021-01-04 PROCEDURE — 99284 EMERGENCY DEPT VISIT MOD MDM: CPT

## 2021-01-04 PROCEDURE — 73140 X-RAY EXAM OF FINGER(S): CPT

## 2021-01-04 RX ORDER — DOXYCYCLINE HYCLATE 100 MG
100 TABLET ORAL 2 TIMES DAILY
Qty: 10 TABLET | Refills: 0 | Status: SHIPPED | OUTPATIENT
Start: 2021-01-04 | End: 2021-01-09

## 2021-01-04 ASSESSMENT — PAIN DESCRIPTION - INTENSITY: RATING_2: 6

## 2021-01-04 ASSESSMENT — PAIN DESCRIPTION - PAIN TYPE
TYPE_2: ACUTE PAIN
TYPE: ACUTE PAIN

## 2021-01-04 ASSESSMENT — PAIN DESCRIPTION - LOCATION
LOCATION: FINGER (COMMENT WHICH ONE)
LOCATION: FINGER (COMMENT WHICH ONE)

## 2021-01-04 ASSESSMENT — PAIN DESCRIPTION - DESCRIPTORS: DESCRIPTORS_2: BURNING

## 2021-01-04 ASSESSMENT — PAIN DESCRIPTION - ORIENTATION
ORIENTATION_2: LOWER
ORIENTATION_2: LOWER

## 2021-01-04 NOTE — ED PROVIDER NOTES
[facility] EMERGENCY DEPT VISIT      Patient Identification  Mychal Cotton is a 30 y.o. male. Chief Complaint   Abscess (12/31, pt seen for facial abscess. sts taking abx as prescribed. reports couple days after visit, started draining. here for follow up. also reports lower inner lip \"sensative, hurts making it hard to eat\" c/o pain left 3rd finger. no injury)      History of Present Illness: This is a  30 y.o. male who presents ambulatory  to the ED with complaints of needing wound check for an abscess that was seen on 12/31 to left cheek. He has been on bactrim and size has come down and pain improved. It started to drain. No fever. He thought he was told to come have it removed when it started to drain. Also complains of new pain inside mouth adjacent to lower gums which make it painful to eat. No dental pain. Left middle finger pain and swelling and redness for 3 days. No injury. No known foreign body. Does not check his blood sugars on this finger. Hurts to move finger. No drainage. No skin breakdown or known punctures. Past Medical History:   Diagnosis Date    Depression     HIV (human immunodeficiency virus infection) ([facility] [address]     Hypertension     MRSA (methicillin resistant staph aureus) culture positive 06/06/2018    leg       No past surgical history on file. No current facility-administered medications for this encounter.      Current Outpatient Medications:     doxycycline hyclate (VIBRA-TABS) 100 MG tablet, Take 1 tablet by mouth 2 times daily for 5 days, Disp: 10 tablet, Rfl: 0    sulfamethoxazole-trimethoprim (BACTRIM DS) 800-160 MG per tablet, Take 1 tablet by mouth 2 times daily for 7 days, Disp: 14 tablet, Rfl: 0    metFORMIN (GLUCOPHAGE-XR) 500 MG extended release tablet, Take 1 tablet by mouth daily with breakfast for 2 weeks then increase to 2 tablets daily with breakfast., Disp: , Rfl:     hydrochlorothiazide (MICROZIDE) 12.5 MG capsule, Take 12.5 mg by mouth, Disp: , Rfl:    amLODIPine (NORVASC) 10 MG tablet, Take 10 mg by mouth, Disp: , Rfl:     ibuprofen (ADVIL;MOTRIN) 600 MG tablet, Take 1 tablet by mouth every 6 hours as needed for Pain, Disp: 30 tablet, Rfl: 0    emtricitabine-rilpivirine-tenofovir alafenamide (ODEFSEY) 200-25-25 MG TABS per tablet, Take 1 tablet by mouth daily, Disp: , Rfl:     melatonin 3 MG TABS tablet, Take 3 mg by mouth, Disp: , Rfl:     Dextromethorphan-guaiFENesin (MUCINEX DM MAXIMUM STRENGTH)  MG TB12, Take 1 tablet by mouth every 12 hours as needed (for cough), Disp: 20 tablet, Rfl: 0    hydrocortisone 2.5 % cream, Apply topically 2 times daily. , Disp: 30 g, Rfl: 1    No Known Allergies    Social History     Socioeconomic History    Marital status: Single     Spouse name: Not on file    Number of children: Not on file    Years of education: Not on file    Highest education level: Not on file   Occupational History    Not on file   Social Needs    Financial resource strain: Not on file    Food insecurity     Worry: Not on file     Inability: Not on file    Transportation needs     Medical: Not on file     Non-medical: Not on file   Tobacco Use    Smoking status: Current Some Day Smoker     Packs/day: 0.50    Smokeless tobacco: Never Used   Substance and Sexual Activity    Alcohol use: Yes     Comment: rarely    Drug use: Not Currently     Types: Marijuana     Comment: once on 5/16/2020    Sexual activity: Never   Lifestyle    Physical activity     Days per week: Not on file     Minutes per session: Not on file    Stress: Not on file   Relationships    Social connections     Talks on phone: Not on file     Gets together: Not on file     Attends Sabianist service: Not on file     Active member of club or organization: Not on file     Attends meetings of clubs or organizations: Not on file     Relationship status: Not on file    Intimate partner violence     Fear of current or ex partner: Not on file     Emotionally abused: Not on file     Physically abused: Not on file     Forced sexual activity: Not on file   Other Topics Concern    Not on file   Social History Narrative    Not on file       Nursing Notes Reviewed      ROS:  General: no fever  ENT: no sinus congestion, no sore throat  RESP: no cough, no shortness of breath  CARDIAC: no chest pain  GI: no abdominal pain, no vomiting, no diarrhea  Musculoskeletal: + arthralgia, no myalgia, no back pain,  no joint swelling  NEURO: no headache, no numbness, no weakness, no dizziness  DERM: no rash, + erythema, no ecchymosis, + wounds      PHYSICAL EXAM:  GENERAL APPEARANCE: Veena Branch is in no acute respiratory distress. Awake and alert. VITAL SIGNS:   ED Triage Vitals [01/04/21 0706]   Enc Vitals Group      /86      Pulse 97      Resp 17      Temp 98.3 °F (36.8 °C)      Temp Source Oral      SpO2 100 %      Weight 276 lb 11.2 oz (125.5 kg)      Height       Head Circumference       Peak Flow       Pain Score       Pain Loc       Pain Edu? Excl. in [address]? HEAD: Normocephalic, atraumatic. EYES:  Extraocular muscles are intact. Conjunctivas are pink. Negative scleral icterus. ENT:  Mucous membranes are moist.  Pharynx without erythema or exudates. Floor of mouth soft. No dental tenderness. Left lower gingiva with canker sore present. No swelling. Handling secretions well. NECK: Nontender and supple. CHEST: Clear to auscultation bilaterally. No rales, rhonchi, or wheezing. HEART:  Regular rate and rhythm. No murmurs. Strong and equal pulses in the upper and lower extremities. ABDOMEN: Soft,  nondistended, positive bowel sounds. abdomen is nontender. MUSCULOSKELETAL:  Active range of motion of the upper and lower extremities. No edema. Left middle finger with swelling, redness and tenderness at side of DIP joint and over pulp of finger. No fluctuance. Pain with flexion of finger. NEUROLOGICAL: Awake, alert and oriented x 3.  Power intact in the upper and lower extremities. DERMATOLOGIC: No petechiae, rashes, or ecchymoses. Left lower cheek with 1.5cm abscess with erythema localized to abscess site. Scant spontaneous drainage but able to express additional purulence with palpation. ED COURSE AND MEDICAL DECISION MAKING:      Radiology:  All plain films have been evaluated by myself. They may have been overread by radiologist as noted in chart. Other radiologic studies (i.e. CT, MRI, ultrasounds, etc ) have been interpreted by radiologist.     XR FINGER LEFT (MIN 2 VIEWS)   Final Result   1. No acute osseous abnormality. Labs:  No results found for this visit on 01/04/21. Treatment in the department:  Incision and Drainage: The Incision and Drainage procedure was explained to the patient and I receive verbal consent from them. This facial abscess was prepped with chloraprep. The area was prepped again and draped. The area of most fluctuance was identified, and a number 11 blade was used to make a 1/2 cm linear incision. a small amount of purulent material was expressed. The patient experienced some pain but generally tolerated the procedure quite well. Dressing was placed. Finger splint was applied to left middle finger for comfort. Medical decision making:  Patient here for recheck of facial abscess. No surrounding cellulitis. Spontaneously draining but opened a little more to expedite drainage. Oral cavity with canker sore, no intraoral abscess. Recommend oragel. Left finger apperas reddened with possible early blistering at DIP joint. No recollection of burning finger or puncturing finger. No fluctuance or signs of tenosyovitis. Already on antibiotics but appears infected it not burned. Will add doxycyline. I estimate there is LOW risk for CELLULITIS, NECROTIZING FASCIITIS, TENDON OR NEUROVASCULAR INJURY, or FOREIGN BODY,LUDWIGS, AIRWAY COMPROMISE, DEEP SPACE INFECTION, , SEPSIS, thus I consider the discharge disposition reasonable. Dariana Huntley Robel Rios and I have discussed the diagnosis and risks, and we agree with discharging home to follow-up with their primary doctor. We also discussed returning to the Emergency Department immediately if new or worsening symptoms occur. We have discussed the symptoms which are most concerning (e.g., changing or worsening pain, fever, numbness, weakness, cool or painful digits) that necessitate immediate return      Clinical Impression:  1. Facial abscess    2. Canker sores oral    3. Cellulitis of left middle finger        Dispo:  Patient will be discharged  at this time. Patient was informed of this decision and agrees with plan. I have discussed lab and xray findings with patient and they understand. Questions were answered to the best of my ability. Discharge vitals:  Blood pressure 136/86, pulse 97, temperature 98.3 °F (36.8 °C), temperature source Oral, resp. rate 17, weight 276 lb 11.2 oz (125.5 kg), SpO2 100 %. Prescriptions given:   New Prescriptions    DOXYCYCLINE HYCLATE (VIBRA-TABS) 100 MG TABLET    Take 1 tablet by mouth 2 times daily for 5 days         This chart was created using dragon voice recognition software.         Sandoval Valadez MD  01/04/21 5614

## 2021-01-04 NOTE — ED NOTES
I and D done per Dr. Motley Breath to facial abscess. See Dr's notes.  Pt tolerated well     Enedina Solo  01/04/21 8605

## 2021-05-04 ENCOUNTER — HOSPITAL ENCOUNTER (EMERGENCY)
Age: 26
Discharge: HOME | End: 2021-05-04
Attending: EMERGENCY MEDICINE
Payer: COMMERCIAL

## 2021-05-04 VITALS
WEIGHT: 278 LBS | HEART RATE: 99 BPM | DIASTOLIC BLOOD PRESSURE: 78 MMHG | BODY MASS INDEX: 36.84 KG/M2 | RESPIRATION RATE: 18 BRPM | OXYGEN SATURATION: 100 % | SYSTOLIC BLOOD PRESSURE: 127 MMHG | HEIGHT: 73 IN | TEMPERATURE: 98.7 F

## 2021-05-04 DIAGNOSIS — M53.82 MUSCULOSKELETAL DISORDER INVOLVING SUBOCCIPITAL REGION: Primary | ICD-10-CM

## 2021-05-04 PROCEDURE — 99283 EMERGENCY DEPT VISIT LOW MDM: CPT

## 2021-05-04 RX ORDER — IBUPROFEN 600 MG/1
600 TABLET ORAL EVERY 6 HOURS PRN
Qty: 40 TABLET | Refills: 0 | Status: SHIPPED | OUTPATIENT
Start: 2021-05-04

## 2021-05-04 RX ORDER — CYCLOBENZAPRINE HCL 10 MG
10 TABLET ORAL 3 TIMES DAILY PRN
Qty: 21 TABLET | Refills: 0 | Status: SHIPPED | OUTPATIENT
Start: 2021-05-04 | End: 2021-05-14

## 2021-05-04 RX ORDER — LIDOCAINE 50 MG/G
1 PATCH TOPICAL DAILY
Qty: 10 PATCH | Refills: 0 | Status: SHIPPED | OUTPATIENT
Start: 2021-05-04 | End: 2021-05-14

## 2021-05-04 ASSESSMENT — PAIN SCALES - GENERAL: PAINLEVEL_OUTOF10: 7

## 2021-05-04 NOTE — ED PROVIDER NOTES
CHIEF COMPLAINT  Neck pain    HISTORY OF PRESENT ILLNESS  Lydia Palacios  is a 30 y.o. male who presents to the ED at via private vehicle complaining of left-sided neck pain. Patient reports that he slept at his sister's house a couple of days ago and since waking up at that time has noted pain along the left upper aspect of his neck. Pain is worse with attempts at rotating neck to the left and also with palpation. Patient denies radicular symptoms. No numbness, or tingling has been reported. Pain is not worsened by arm movement. There are no other complaints, modifying factors or associated symptoms. Nursing notes reviewed. Past medical history:  has a past medical history of Depression, HIV (human immunodeficiency virus infection) ([facility] [address] Hypertension, and MRSA (methicillin resistant staph aureus) culture positive (06/06/2018). Past surgical history:  has no past surgical history on file. Home medications:   Prior to Admission medications    Medication Sig Start Date End Date Taking?  Authorizing Provider   ibuprofen (IBU) 600 MG tablet Take 1 tablet by mouth every 6 hours as needed for Pain 5/4/21  Yes Rice Debi, DO   cyclobenzaprine (FLEXERIL) 10 MG tablet Take 1 tablet by mouth 3 times daily as needed for Muscle spasms 5/4/21 5/14/21 Yes Rice Debi, DO   lidocaine (LIDODERM) 5 % Place 1 patch onto the skin daily for 10 days 12 hours on, 12 hours off. 5/4/21 5/14/21 Yes Rice Debi, DO   metFORMIN (GLUCOPHAGE-XR) 500 MG extended release tablet Take 1 tablet by mouth daily with breakfast for 2 weeks then increase to 2 tablets daily with breakfast. 8/1/19   Historical Provider, MD   melatonin 3 MG TABS tablet Take 3 mg by mouth 7/23/19   Historical Provider, MD   hydrochlorothiazide (MICROZIDE) 12.5 MG capsule Take 12.5 mg by mouth 3/14/18   Historical Provider, MD   amLODIPine (NORVASC) 10 MG tablet Take 10 mg by mouth 2/12/18   Historical Provider, MD   hydrocortisone 2.5 % cream Apply topically 2 times daily. 12/9/17   Maame Durant MD   emtricitabine-rilpivirine-tenofovir alafenamide (ODEFSEY) 056-96-96 MG TABS per tablet Take 1 tablet by mouth daily    Historical Provider, MD       No Known Allergies    Social history:  reports that he has been smoking. He has been smoking about 0.50 packs per day. He has never used smokeless tobacco. He reports current alcohol use. He reports previous drug use. Drug: Marijuana. Family history:  No family history on file. REVIEW OF SYSTEMS  6 systems reviewed, pertinent positives per HPI otherwise noted to be negative    PHYSICAL EXAM  Vitals:    05/04/21 1229   BP: 127/78   Pulse: 99   Resp: 18   Temp: 98.7 °F (37.1 °C)   SpO2: 100%       GENERAL: Patient is well-developed, well-nourished,  no acute distress. moderate apparent discomfort. Non toxic appearing. HEENT:  Normocephalic, atraumatic. PERRL. Conjunctiva appear normal.  External ears are normal.  MMM  NECK: Supple with normal ROM. Trachea midline  LUNGS:  Normal work of breathing. Speaking comfortably in full sentences. EXTREMITIES: 2+ distal pulses w/o edema. MUSCULOSKELETAL:  Atraumatic extremities with normal ROM grossly. No obvious bony deformities. SKIN: Warm/dry. No rashes/lesions noted. PSYCHIATRIC: Patient is alert and oriented with normal affect  NEUROLOGIC: Cranial nerves grossly intact. Moves all extremities with equal strength. No gross sensory deficits. Answers questions/follows commands appropriately. ED COURSE/MDM  Nursing notes reviewed. Pt was given the following medications or treatments in the ED: none. No imaging was felt to be necessary given atraumatic etiology as noted. Clinical Impression  Based on the presenting complaint, history, and physical exam, multiple diagnoses were considered. Exam and workup here most c/w:  1.  Musculoskeletal disorder involving suboccipital region        I discussed with Mirza Cruz the results of evaluation in the ED, diagnosis, care, and prognosis. The plan is to discharge to home. Patient is in agreement with plan and questions have been answered. I also discussed with Tarah Sellers the reasons which may require a return visit and the importance of follow-up care. The patient is well-appearing, nontoxic, and improved at the time of discharge. Patient agrees to call to arrange follow-up care as directed. Tarah Sellers understands to return immediately for worsening/change in symptoms. Patient will be started on the following medications from the ED:  New Prescriptions    CYCLOBENZAPRINE (FLEXERIL) 10 MG TABLET    Take 1 tablet by mouth 3 times daily as needed for Muscle spasms    IBUPROFEN (IBU) 600 MG TABLET    Take 1 tablet by mouth every 6 hours as needed for Pain    LIDOCAINE (LIDODERM) 5 %    Place 1 patch onto the skin daily for 10 days 12 hours on, 12 hours off. Disposition  Pt is discharged in stable condition.     Disposition Vitals:  /78   Pulse 99   Temp 98.7 °F (37.1 °C) (Oral)   Resp 18   Ht 6' 1\" (1.854 m)   Wt 278 lb (126.1 kg)   SpO2 100%   BMI 36.68 kg/m²                    Warren Fournier DO  05/04/21 5977

## 2021-05-16 ENCOUNTER — HOSPITAL ENCOUNTER (EMERGENCY)
Age: 26
Discharge: HOME | End: 2021-05-16
Attending: EMERGENCY MEDICINE
Payer: COMMERCIAL

## 2021-05-16 VITALS
OXYGEN SATURATION: 98 % | DIASTOLIC BLOOD PRESSURE: 87 MMHG | SYSTOLIC BLOOD PRESSURE: 139 MMHG | HEART RATE: 94 BPM | WEIGHT: 283 LBS | TEMPERATURE: 98.4 F | BODY MASS INDEX: 37.34 KG/M2 | RESPIRATION RATE: 16 BRPM

## 2021-05-16 DIAGNOSIS — L03.811 ABSCESS OR CELLULITIS OF SCALP: Primary | ICD-10-CM

## 2021-05-16 PROCEDURE — 99282 EMERGENCY DEPT VISIT SF MDM: CPT

## 2021-05-16 RX ORDER — SULFAMETHOXAZOLE AND TRIMETHOPRIM 800; 160 MG/1; MG/1
1 TABLET ORAL 2 TIMES DAILY
Qty: 20 TABLET | Refills: 0 | Status: SHIPPED | OUTPATIENT
Start: 2021-05-16 | End: 2021-05-26

## 2021-05-16 RX ORDER — CEPHALEXIN 500 MG/1
500 CAPSULE ORAL 3 TIMES DAILY
Qty: 30 CAPSULE | Refills: 0 | Status: SHIPPED | OUTPATIENT
Start: 2021-05-16 | End: 2021-05-26

## 2021-05-16 ASSESSMENT — PAIN SCALES - GENERAL: PAINLEVEL_OUTOF10: 8

## 2021-05-16 ASSESSMENT — PAIN DESCRIPTION - LOCATION: LOCATION: NECK

## 2021-05-16 ASSESSMENT — PAIN DESCRIPTION - ORIENTATION: ORIENTATION: LEFT

## 2021-05-16 NOTE — ED PROVIDER NOTES
[facility] CHIEF COMPLAINT:   Chief Complaint   Patient presents with    Neck Pain         HPI: Manolo Horowitz is a 30 y.o. male who presents to the Emergency Department with complaint of a sore swollen area in the left occipital scalp. Patient was seen here on May 4 for complaints of left-sided neck and trapezius pain after sleeping in an awkward position. He stated that pain was in his neck primarily and worsened with movement of his head. No other injury noted. He was given prescriptions for Lidoderm patch, ibuprofen and Flexeril and the neck pain largely resolved. Over the last few days he has noticed a tender area in the left lower occipital scalp which she thinks has become slightly swollen. There is been no drainage. No fever or chills. No trauma to the area. He states this is different than the neck pain. Denies sore throat or ear pain. No trauma to the scalp. He has a history of diabetes, hypertension and HIV disease on antiviral therapy. REVIEW OF SYSTEMS:  6 systems reviewed. Pertinent positives per HPI. Otherwise noted to be negative. Nursing notes reviewed and agree with above. Past medical/surgical history reviewed.     MEDICATIONS   Patient's Medications   New Prescriptions    CEPHALEXIN (KEFLEX) 500 MG CAPSULE    Take 1 capsule by mouth 3 times daily for 10 days    SULFAMETHOXAZOLE-TRIMETHOPRIM (BACTRIM DS) 800-160 MG PER TABLET    Take 1 tablet by mouth 2 times daily for 10 days   Previous Medications    AMLODIPINE (NORVASC) 10 MG TABLET    Take 10 mg by mouth    CHLORTHALIDONE PO    Take by mouth    EMTRICITABINE-RILPIVIRINE-TENOFOVIR ALAFENAMIDE (ODEFSEY) 200-25-25 MG TABS PER TABLET    Take 1 tablet by mouth daily    HYDROCHLOROTHIAZIDE (MICROZIDE) 12.5 MG CAPSULE    Take 12.5 mg by mouth    IBUPROFEN (IBU) 600 MG TABLET    Take 1 tablet by mouth every 6 hours as needed for Pain    LOSARTAN POTASSIUM PO    Take by mouth    MELATONIN 3 MG TABS TABLET    Take 3 mg by mouth which is tender to touch. This area appears mildly erythematous but there is no fluctuance or drainage. Suspect possible infection and will treat with Keflex/Bactrim along with warm compresses and Tylenol or ibuprofen for pain. He has a small lymph node in the right supra clavicular/lower neck area which is unrelated. I recommended follow-up for recheck in 7 to 10 days with his primary care doctor. I discussed with John Tian the results of evaluation in the Emergency Department, diagnosis, care and prognosis. The plan is to discharge to home. The patient is in agreement with the plan and questions have been answered. I also discussed with the patient and/or family the reasons which may require a return visit and the importance of follow-up care.        (Please note that portions of this note may have been completed with a voice recognition program.  Efforts were made to edit the dictation but occasionally words are mis-transcribed)        FINAL IMPRESSION:  1 --abscess or cellulitis of the scalp            Odilia Banerjee MD  05/16/21 4169

## 2021-05-18 ENCOUNTER — HOSPITAL ENCOUNTER (EMERGENCY)
Age: 26
Discharge: ANOTHER HOSPITAL | End: 2021-05-18
Attending: EMERGENCY MEDICINE
Payer: COMMERCIAL

## 2021-05-18 VITALS
HEART RATE: 92 BPM | BODY MASS INDEX: 36.43 KG/M2 | TEMPERATURE: 98.4 F | HEIGHT: 73 IN | DIASTOLIC BLOOD PRESSURE: 86 MMHG | RESPIRATION RATE: 16 BRPM | SYSTOLIC BLOOD PRESSURE: 141 MMHG | OXYGEN SATURATION: 100 % | WEIGHT: 274.9 LBS

## 2021-05-18 DIAGNOSIS — K12.1 MOUTH ULCERATION: Primary | ICD-10-CM

## 2021-05-18 DIAGNOSIS — R21 RASH AND NONSPECIFIC SKIN ERUPTION: ICD-10-CM

## 2021-05-18 DIAGNOSIS — T50.905A ADVERSE EFFECT OF DRUG, INITIAL ENCOUNTER: ICD-10-CM

## 2021-05-18 LAB
A/G RATIO: 1.1 (ref 1.1–2.2)
ALBUMIN SERPL-MCNC: 4.8 G/DL (ref 3.4–5)
ALP BLD-CCNC: 79 U/L (ref 40–129)
ALT SERPL-CCNC: 109 U/L (ref 10–40)
ANION GAP SERPL CALCULATED.3IONS-SCNC: 11 MMOL/L (ref 3–16)
AST SERPL-CCNC: 36 U/L (ref 15–37)
BASOPHILS ABSOLUTE: 0.1 K/UL (ref 0–0.2)
BASOPHILS RELATIVE PERCENT: 1 %
BILIRUB SERPL-MCNC: 0.5 MG/DL (ref 0–1)
BUN BLDV-MCNC: 12 MG/DL (ref 7–20)
CALCIUM SERPL-MCNC: 10.4 MG/DL (ref 8.3–10.6)
CHLORIDE BLD-SCNC: 96 MMOL/L (ref 99–110)
CO2: 27 MMOL/L (ref 21–32)
CREAT SERPL-MCNC: 1.1 MG/DL (ref 0.9–1.3)
EOSINOPHILS ABSOLUTE: 0 K/UL (ref 0–0.6)
EOSINOPHILS RELATIVE PERCENT: 0.5 %
GFR AFRICAN AMERICAN: >60
GFR NON-AFRICAN AMERICAN: >60
GLOBULIN: 4.2 G/DL
GLUCOSE BLD-MCNC: 145 MG/DL (ref 70–99)
HCT VFR BLD CALC: 44.5 % (ref 40.5–52.5)
HEMOGLOBIN: 14.5 G/DL (ref 13.5–17.5)
LYMPHOCYTES ABSOLUTE: 1.6 K/UL (ref 1–5.1)
LYMPHOCYTES RELATIVE PERCENT: 22.2 %
MCH RBC QN AUTO: 25.5 PG (ref 26–34)
MCHC RBC AUTO-ENTMCNC: 32.6 G/DL (ref 31–36)
MCV RBC AUTO: 78.2 FL (ref 80–100)
MONOCYTES ABSOLUTE: 0.6 K/UL (ref 0–1.3)
MONOCYTES RELATIVE PERCENT: 8.4 %
NEUTROPHILS ABSOLUTE: 4.9 K/UL (ref 1.7–7.7)
NEUTROPHILS RELATIVE PERCENT: 67.9 %
PDW BLD-RTO: 13.5 % (ref 12.4–15.4)
PLATELET # BLD: 401 K/UL (ref 135–450)
PMV BLD AUTO: 7.5 FL (ref 5–10.5)
POTASSIUM REFLEX MAGNESIUM: 3.7 MMOL/L (ref 3.5–5.1)
RBC # BLD: 5.69 M/UL (ref 4.2–5.9)
SODIUM BLD-SCNC: 134 MMOL/L (ref 136–145)
TOTAL PROTEIN: 9 G/DL (ref 6.4–8.2)
WBC # BLD: 7.2 K/UL (ref 4–11)

## 2021-05-18 PROCEDURE — 99285 EMERGENCY DEPT VISIT HI MDM: CPT

## 2021-05-18 PROCEDURE — 85025 COMPLETE CBC W/AUTO DIFF WBC: CPT

## 2021-05-18 PROCEDURE — 80053 COMPREHEN METABOLIC PANEL: CPT

## 2021-05-18 PROCEDURE — 2580000003 HC RX 258: Performed by: EMERGENCY MEDICINE

## 2021-05-18 PROCEDURE — 6370000000 HC RX 637 (ALT 250 FOR IP): Performed by: EMERGENCY MEDICINE

## 2021-05-18 RX ORDER — DIPHENHYDRAMINE HCL 25 MG
25 TABLET ORAL ONCE
Status: COMPLETED | OUTPATIENT
Start: 2021-05-18 | End: 2021-05-18

## 2021-05-18 RX ORDER — 0.9 % SODIUM CHLORIDE 0.9 %
1000 INTRAVENOUS SOLUTION INTRAVENOUS ONCE
Status: COMPLETED | OUTPATIENT
Start: 2021-05-18 | End: 2021-05-18

## 2021-05-18 RX ORDER — ACETAMINOPHEN 325 MG/1
650 TABLET ORAL ONCE
Status: COMPLETED | OUTPATIENT
Start: 2021-05-18 | End: 2021-05-18

## 2021-05-18 RX ADMIN — ACETAMINOPHEN 650 MG: 325 TABLET ORAL at 16:58

## 2021-05-18 RX ADMIN — SODIUM CHLORIDE 1000 ML: 9 INJECTION, SOLUTION INTRAVENOUS at 16:58

## 2021-05-18 RX ADMIN — DIPHENHYDRAMINE HYDROCHLORIDE 25 MG: 25 TABLET ORAL at 16:58

## 2021-05-18 ASSESSMENT — ENCOUNTER SYMPTOMS
BACK PAIN: 0
ABDOMINAL PAIN: 0
SORE THROAT: 0
NAUSEA: 0
COUGH: 0
TROUBLE SWALLOWING: 1
COLOR CHANGE: 1
SHORTNESS OF BREATH: 0
VOMITING: 0

## 2021-05-18 NOTE — ED PROVIDER NOTES
[id number] [address] ENCOUNTER      Pt Name: Kim Gonzales  MRN: [id number]  [place] 1995  Date of evaluation: 5/18/2021  Provider: Mio Murphy MD    [address] [place] [address]       Chief Complaint   Patient presents with    Allergic Reaction     pt seen in er 5/16 for scalp abscess. was started on bacrim and keflex that day. day  later, felt like  throat closing up, was having a hard time swallowing. noticed itching left 3rd finger, blister started. also developed lip swelling with blister lower lip and inside mouth. took abx's yesterday, did not take any abx today. denies sob         HISTORY OF PRESENT ILLNESS   (Location/Symptom, Timing/Onset, Context/Setting, Quality, Duration, Modifying Factors, Severity)  Note limiting factors. Kim Gonzales is a 30 y.o. male who presents to the emergency department     Patient is a 30year old male with a PMHx of HTN, HIV who presents with painful lesions in his mouth and left middle finger after starting bactrim on Sunday night. Patient was seen in the emergency department on 5/16/21 for an area of swelling on posterior scalp. He was started on Bactrim and Keflex and began taking it the evening of May 16. Patient noted on the 17th he developed ulcers in his mouth and an area of pain on his left middle finger. Patient reported some difficulty swallowing food but was still able to drink liquids. Did not take the antibiotics today and came in for reevaluation. Reports fatigue but denies any fevers or chills. Has never had lesions in his mouth previously. Has history of HIV but last CD4 count was normal and viral loads were undetectable and he is compliant with his antiretroviral medications. Denies any chest pain, shortness of breath, nausea, vomiting. The history is provided by the patient. Nursing Notes were reviewed.     REVIEW OF SYSTEMS    (2-9 systems for level 4, 10 or more for level 5)     Review of statement:     Critical care time (minutes):  32    Critical care time was exclusive of:  Separately billable procedures and treating other patients and teaching time    Critical care was necessary to treat or prevent imminent or life-threatening deterioration of the following conditions: Concern for [employer] Syndrome. Critical care was time spent personally by me on the following activities:  Development of treatment plan with patient or surrogate, discussions with consultants, evaluation of patient's response to treatment, examination of patient, obtaining history from patient or surrogate, ordering and performing treatments and interventions, ordering and review of laboratory studies, pulse oximetry, re-evaluation of patient's condition and review of old charts          FINAL IMPRESSION      1. Mouth ulceration    2. Rash and nonspecific skin eruption    3. Adverse effect of drug, initial encounter          DISPOSITION/PLAN   DISPOSITION        PATIENT REFERRED TO:  No follow-up provider specified. DISCHARGE MEDICATIONS:  New Prescriptions    No medications on file     Controlled Substances Monitoring:     RX Monitoring 10/24/2017   Attestation The Prescription Monitoring Report for this patient was reviewed today. Periodic Controlled Substance Monitoring No signs of potential drug abuse or diversion identified.        (Please note that portions of this note were completed with a voice recognition program.  Efforts were made to edit the dictations but occasionally words are mis-transcribed.)    Alejandro Zavala MD (electronically signed)  Attending Emergency Physician           Tamiko Daily MD  05/18/21 5323

## 2021-05-19 NOTE — ED NOTES
[facility] will be here at 2100 to take pt to [address] [facility] [address], RN  05/18/21 2013 HD dialysis in process, R & L groin sites stable. Pt denies pain. Call light within reach.

## 2021-05-19 NOTE — ED NOTES
Report called Rahat Olivier  [address] [facility], [address] [place] [address]  05/18/21 2018

## 2021-07-11 ENCOUNTER — HOSPITAL ENCOUNTER (EMERGENCY)
Age: 26
Discharge: HOME | End: 2021-07-12
Attending: EMERGENCY MEDICINE
Payer: COMMERCIAL

## 2021-07-11 ENCOUNTER — APPOINTMENT (OUTPATIENT)
Dept: GENERAL RADIOLOGY | Age: 26
End: 2021-07-11
Payer: COMMERCIAL

## 2021-07-11 ENCOUNTER — APPOINTMENT (OUTPATIENT)
Dept: CT IMAGING | Age: 26
End: 2021-07-11
Payer: COMMERCIAL
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
## 2021-07-11 PROCEDURE — 99283 EMERGENCY DEPT VISIT LOW MDM: CPT

## 2021-07-11 PROCEDURE — 70486 CT MAXILLOFACIAL W/O DYE: CPT

## 2021-07-11 ASSESSMENT — PAIN SCALES - GENERAL: PAINLEVEL_OUTOF10: 4

## 2021-07-11 NOTE — LETTER
[facility] Emergency Department  [address] [facility] [address]  Phone: [phone]               July 12, 2021    Patient: Santa Galindo   YOB: 1995   Date of Visit: 7/11/2021       To Whom It May Concern:    Gerald White was seen and treated in our emergency department on 7/11/2021. He my return to work 7/12/2021.       Sincerely,               Signature:__________________________________

## 2021-07-12 VITALS
HEART RATE: 91 BPM | OXYGEN SATURATION: 99 % | RESPIRATION RATE: 17 BRPM | SYSTOLIC BLOOD PRESSURE: 124 MMHG | DIASTOLIC BLOOD PRESSURE: 78 MMHG | TEMPERATURE: 99.6 F

## 2021-07-12 ASSESSMENT — ENCOUNTER SYMPTOMS
EYE PAIN: 0
EYE ITCHING: 0
CHEST TIGHTNESS: 0
DIARRHEA: 0
EYE DISCHARGE: 0
SHORTNESS OF BREATH: 0
PHOTOPHOBIA: 0
CONSTIPATION: 0
ANAL BLEEDING: 0
COLOR CHANGE: 0
NAUSEA: 0
COUGH: 0
FACIAL SWELLING: 1
APNEA: 0
STRIDOR: 0
ABDOMINAL DISTENTION: 0
EYE REDNESS: 0
RECTAL PAIN: 0
BACK PAIN: 0
CHOKING: 0
WHEEZING: 0
VOMITING: 0
BLOOD IN STOOL: 0
ABDOMINAL PAIN: 0
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
## 2021-10-21 ENCOUNTER — HOSPITAL ENCOUNTER (EMERGENCY)
Age: 26
Discharge: HOME | End: 2021-10-21
Attending: EMERGENCY MEDICINE
Payer: COMMERCIAL

## 2021-10-21 ENCOUNTER — APPOINTMENT (OUTPATIENT)
Dept: GENERAL RADIOLOGY | Age: 26
End: 2021-10-21
Payer: COMMERCIAL

## 2021-10-21 VITALS
RESPIRATION RATE: 18 BRPM | BODY MASS INDEX: 35.73 KG/M2 | WEIGHT: 269.6 LBS | SYSTOLIC BLOOD PRESSURE: 151 MMHG | TEMPERATURE: 98.2 F | HEART RATE: 86 BPM | OXYGEN SATURATION: 99 % | DIASTOLIC BLOOD PRESSURE: 91 MMHG | HEIGHT: 73 IN

## 2021-10-21 DIAGNOSIS — R07.9 CHEST PAIN, UNSPECIFIED TYPE: Primary | ICD-10-CM

## 2021-10-21 LAB
EKG ATRIAL RATE: 95 BPM
EKG DIAGNOSIS: NORMAL
EKG P AXIS: 55 DEGREES
EKG P-R INTERVAL: 132 MS
EKG Q-T INTERVAL: 320 MS
EKG QRS DURATION: 88 MS
EKG QTC CALCULATION (BAZETT): 402 MS
EKG R AXIS: 81 DEGREES
EKG T AXIS: -35 DEGREES
EKG VENTRICULAR RATE: 95 BPM

## 2021-10-21 PROCEDURE — 71046 X-RAY EXAM CHEST 2 VIEWS: CPT

## 2021-10-21 PROCEDURE — 93005 ELECTROCARDIOGRAM TRACING: CPT | Performed by: EMERGENCY MEDICINE

## 2021-10-21 PROCEDURE — 99283 EMERGENCY DEPT VISIT LOW MDM: CPT

## 2021-10-21 PROCEDURE — 6370000000 HC RX 637 (ALT 250 FOR IP): Performed by: EMERGENCY MEDICINE

## 2021-10-21 PROCEDURE — 93010 ELECTROCARDIOGRAM REPORT: CPT | Performed by: INTERNAL MEDICINE

## 2021-10-21 RX ORDER — KETOROLAC TROMETHAMINE 30 MG/ML
30 INJECTION, SOLUTION INTRAMUSCULAR; INTRAVENOUS ONCE
Status: DISCONTINUED | OUTPATIENT
Start: 2021-10-21 | End: 2021-10-21

## 2021-10-21 RX ORDER — ORPHENADRINE CITRATE 30 MG/ML
60 INJECTION INTRAMUSCULAR; INTRAVENOUS ONCE
Status: DISCONTINUED | OUTPATIENT
Start: 2021-10-21 | End: 2021-10-21

## 2021-10-21 RX ORDER — IBUPROFEN 400 MG/1
400 TABLET ORAL ONCE
Status: COMPLETED | OUTPATIENT
Start: 2021-10-21 | End: 2021-10-21

## 2021-10-21 RX ORDER — METHOCARBAMOL 500 MG/1
1500 TABLET, FILM COATED ORAL ONCE
Status: COMPLETED | OUTPATIENT
Start: 2021-10-21 | End: 2021-10-21

## 2021-10-21 RX ORDER — ORPHENADRINE CITRATE 100 MG/1
100 TABLET, EXTENDED RELEASE ORAL ONCE
Status: DISCONTINUED | OUTPATIENT
Start: 2021-10-21 | End: 2021-10-21

## 2021-10-21 RX ADMIN — IBUPROFEN 400 MG: 400 TABLET, FILM COATED ORAL at 02:33

## 2021-10-21 RX ADMIN — METHOCARBAMOL 1500 MG: 500 TABLET ORAL at 02:33

## 2021-10-21 ASSESSMENT — ENCOUNTER SYMPTOMS
SHORTNESS OF BREATH: 0
NAUSEA: 0
VOMITING: 0
COUGH: 0
SORE THROAT: 0

## 2021-10-21 ASSESSMENT — PAIN SCALES - GENERAL: PAINLEVEL_OUTOF10: 1

## 2021-10-21 NOTE — ED NOTES
Patient prepared for and ready to be discharged. Patient discharged at this time in no acute distress after verbalizing understanding of discharge instructions. Patient left after receiving After Visit Summary instructions.         Loyda Chu RN  10/21/21 8653

## 2021-10-21 NOTE — ED PROVIDER NOTES
[id number] [address] ENCOUNTER      Pt Name: Leonora Reynolds  MRN: [id number]  [place] 1995  Date of evaluation: 10/21/2021  Provider: Ariadna Escamilla MD    CHIEF COMPLAINT       Chief Complaint   Patient presents with    Chest Pain         HISTORY OF PRESENT ILLNESS   (Location/Symptom, Timing/Onset,Context/Setting, Quality, Duration, Modifying Factors, Severity)  Note limiting factors. Leonora Reynolds is a 30 y.o. male who presents to the emergency department for chest pain for 2 days, moving and getting around makes it worse. Tonight it hurt when he took out the trash so he decided to come in to get check out. No h/o heart disease. Nursing notes were reviewed. REVIEW OF SYSTEMS    (2-9 systems for level 4, 10 or more for level 5)     Review of Systems   Constitutional: Negative for fever. HENT: Negative for sore throat. Respiratory: Negative for cough and shortness of breath. Cardiovascular: Positive for chest pain. Gastrointestinal: Negative for nausea and vomiting. Neurological: Negative for light-headedness. PAST MEDICAL HISTORY     Past Medical History:   Diagnosis Date    Depression     [facility] ([facility] [address]     HIV (human immunodeficiency virus infection) ([facility] [address] [facility]     Hypertension     MRSA (methicillin resistant staph aureus) culture positive 06/06/2018    leg         SURGICALHISTORY     History reviewed. No pertinent surgical history.       CURRENT MEDICATIONS       Current Discharge Medication List      CONTINUE these medications which have NOT CHANGED    Details   CHLORTHALIDONE PO Take by mouth      LOSARTAN POTASSIUM PO Take by mouth      metFORMIN (GLUCOPHAGE-XR) 500 MG extended release tablet Take 1 tablet by mouth daily with breakfast for 2 weeks then increase to 2 tablets daily with breakfast.      melatonin 3 MG TABS tablet Take 3 mg by mouth      hydrochlorothiazide (MICROZIDE) 12.5 MG capsule Take 12.5 mg by mouth      amLODIPine (NORVASC) 10 MG tablet Take 10 mg by mouth      emtricitabine-rilpivirine-tenofovir alafenamide (ODEFSEY) 200-25-25 MG TABS per tablet Take 1 tablet by mouth daily      ibuprofen (IBU) 600 MG tablet Take 1 tablet by mouth every 6 hours as needed for Pain  Qty: 40 tablet, Refills: 0             ALLERGIES     Patient has no known allergies. FAMILY HISTORY     History reviewed. No pertinent family history. SOCIAL HISTORY       Social History     Socioeconomic History    Marital status: Single     Spouse name: None    Number of children: None    Years of education: None    Highest education level: None   Occupational History    None   Tobacco Use    Smoking status: Current Every Day Smoker     Packs/day: 0.50    Smokeless tobacco: Never Used   Substance and Sexual Activity    Alcohol use: Yes     Comment: rarely    Drug use: Not Currently     Types: Marijuana     Comment: once on 5/16/2020    Sexual activity: Never   Other Topics Concern    None   Social History Narrative    None     Social Determinants of Health     Financial Resource Strain:     Difficulty of Paying Living Expenses:    Food Insecurity:     Worried About Running Out of Food in the Last Year:     Ran Out of Food in the Last Year:    Transportation Needs:     Lack of Transportation (Medical):      Lack of Transportation (Non-Medical):    Physical Activity:     Days of Exercise per Week:     Minutes of Exercise per Session:    Stress:     Feeling of Stress :    Social Connections:     Frequency of Communication with Friends and Family:     Frequency of Social Gatherings with Friends and Family:     Attends Evangelical Services:     Active Member of Clubs or Organizations:     Attends Club or Organization Meetings:     Marital Status:    Intimate Partner Violence:     Fear of Current or Ex-Partner:     Emotionally Abused:     Physically Abused:     Sexually Abused:        SCREENINGS PHYSICAL EXAM    (up to 7 for level 4, 8 or more for level 5)     ED Triage Vitals [10/21/21 0155]   BP Temp Temp Source Pulse Resp SpO2 Height Weight   (!) 151/91 98.2 °F (36.8 °C) Oral 86 18 99 % 6' 1\" (1.854 m) 269 lb 9.6 oz (122.3 kg)       Physical Exam  Vitals and nursing note reviewed. Constitutional:       Appearance: Normal appearance. He is well-developed. He is not ill-appearing. HENT:      Head: Normocephalic and atraumatic. Right Ear: External ear normal.      Left Ear: External ear normal.      Nose: Nose normal.   Eyes:      General: No scleral icterus. Right eye: No discharge. Left eye: No discharge. Conjunctiva/sclera: Conjunctivae normal.   Cardiovascular:      Rate and Rhythm: Normal rate and regular rhythm. Heart sounds: Normal heart sounds. Pulmonary:      Effort: Pulmonary effort is normal. No respiratory distress. Breath sounds: Normal breath sounds. No wheezing or rales. Abdominal:      General: Bowel sounds are normal.   Musculoskeletal:         General: No swelling, tenderness or deformity. Cervical back: Neck supple. Comments: While he does not have any tenderness to palpation whenever he tries to move especially when he tries to sit up and uses his left arm he feels sharp pain in the costosternal region. Skin:     Coloration: Skin is not pale. Neurological:      Mental Status: He is alert. Psychiatric:         Mood and Affect: Mood normal.         Behavior: Behavior normal.           DIAGNOSTIC RESULTS     EKG: All EKG's are interpreted by the Emergency Department Physician who either signs or Co-signs this chart in the absence of a cardiologist.    12 lead EKG shows normal sinus rhythm with sinus arrhythmia at a rate of 95 bpm, PA interval QRS QTC normal.  No acute ischemic findings. He has T wave inversion diffusely in the inferior lateral leads. No previous for comparison.     RADIOLOGY:   Non-plain film images such as CT, Ultrasound and MRI are read by the radiologist. Plain radiographic images are visualized and preliminarily interpreted by the emergency physician with the below findings:        Interpretation per the Radiologist below, if available at the time of this note:    XR CHEST (2 VW)   Final Result    No acute cardiopulmonary abnormality. ED BEDSIDE ULTRASOUND:   Performed by ED Physician - none    LABS:  Labs Reviewed - No data to display    All other labs were within normal range or not returned as of this dictation. EMERGENCY DEPARTMENT COURSE and DIFFERENTIAL DIAGNOSIS/MDM:   Vitals:    Vitals:    10/21/21 0155   BP: (!) 151/91   Pulse: 86   Resp: 18   Temp: 98.2 °F (36.8 °C)   TempSrc: Oral   SpO2: 99%   Weight: 269 lb 9.6 oz (122.3 kg)   Height: 6' 1\" (1.854 m)     Adult male with 2 days of constant chest pain worse with movement. Chest x-ray shows no acute process EKG does have inferior lateral T wave inversion of no acute ST elevation. His history however makes acute cardiac event less likely. He is given Toradol and Norflex here for his pain and encouraged to follow-up with his primary care provider. The patient expressed understanding and is agreeable to treatment plan. When his nurse went to administer the medications patient states she does not want to get a shot. His medications were switched to oral Robaxin and oral ibuprofen. CRITICAL CARE TIME   None       CONSULTS:  None    PROCEDURES:       Procedures    FINAL IMPRESSION      1.  Chest pain, unspecified type          DISPOSITION/PLAN   DISPOSITION Decision To Discharge 10/21/2021 02:21:49 AM      PATIENT REFERREDTO:  Cathy Costa MD  [address]  [phone]    Schedule an appointment as soon as possible for a visit         DISCHARGEMEDICATIONS:  Current Discharge Medication List             (Please note that portions of this note were completed with a voice recognition program.  Efforts were made to edit the dictations but occasionally words are mis-transcribed.)    Geovanna Johnson MD (electronically signed)  Attending Emergency Physician        Geovanna Johnson MD  10/21/21 8378       Geovanna Johnson MD  10/21/21 2142       Geovanna Johnosn MD  10/21/21 4101

## 2021-12-31 ENCOUNTER — HOSPITAL ENCOUNTER (EMERGENCY)
Age: 26
Discharge: HOME | End: 2021-12-31
Attending: EMERGENCY MEDICINE
Payer: COMMERCIAL

## 2021-12-31 VITALS
BODY MASS INDEX: 35.62 KG/M2 | HEART RATE: 86 BPM | RESPIRATION RATE: 16 BRPM | DIASTOLIC BLOOD PRESSURE: 77 MMHG | SYSTOLIC BLOOD PRESSURE: 136 MMHG | WEIGHT: 270 LBS | OXYGEN SATURATION: 98 % | TEMPERATURE: 98.2 F

## 2021-12-31 DIAGNOSIS — R05.9 COUGH: Primary | ICD-10-CM

## 2021-12-31 DIAGNOSIS — A51.0: ICD-10-CM

## 2021-12-31 PROCEDURE — U0005 INFEC AGEN DETEC AMPLI PROBE: HCPCS

## 2021-12-31 PROCEDURE — U0003 INFECTIOUS AGENT DETECTION BY NUCLEIC ACID (DNA OR RNA); SEVERE ACUTE RESPIRATORY SYNDROME CORONAVIRUS 2 (SARS-COV-2) (CORONAVIRUS DISEASE [COVID-19]), AMPLIFIED PROBE TECHNIQUE, MAKING USE OF HIGH THROUGHPUT TECHNOLOGIES AS DESCRIBED BY CMS-2020-01-R: HCPCS

## 2021-12-31 PROCEDURE — 6360000002 HC RX W HCPCS: Performed by: EMERGENCY MEDICINE

## 2021-12-31 PROCEDURE — 96372 THER/PROPH/DIAG INJ SC/IM: CPT

## 2021-12-31 PROCEDURE — 99282 EMERGENCY DEPT VISIT SF MDM: CPT

## 2021-12-31 RX ADMIN — PENICILLIN G BENZATHINE 2.4 MILLION UNITS: 1200000 INJECTION, SUSPENSION INTRAMUSCULAR at 20:59

## 2021-12-31 NOTE — ED TRIAGE NOTES
Pt wants tested for covid. Around someone recently with it. States he has chest pain when coughing. Also diagnosed with positive syphilis through PCP visit yesterday and is looking to be treated for that as well.

## 2022-01-01 LAB — SARS-COV-2: NOT DETECTED

## 2022-01-01 NOTE — ED NOTES
Pt denies rash, itching or SOB. Patient given d/c instructions with return verbalization. Emphasis on f/u with ID, to return with worsening s/s. Patient ambulated to lobby with steady gait.      Mckinley Herrera RN  12/31/21 2127

## 2022-01-01 NOTE — ED PROVIDER NOTES
[facility] EMERGENCY DEPT VISIT      Patient Identification  Rosalee Nissen is a 27 y.o. male. Chief Complaint   Cough and Other (wants treated for syphilis)      History of Present Illness: This is a  27 y.o. male who presents ambulatory  to the ED with complaints of finding out he had a positive syphillis screen on outpt labs yesterday. He has HIV and has tested negative for syphillis on all previous tests. No fever. No joint pain. No rash. No genital sores. No dysuira or urethral discharge and had negative GC/chlamydia screen. Also complains of cough for a few weeks. No fever. No sputum production. No chest pain or shortness of breath. No h/o pneumocystitis in past.    Past Medical History:   Diagnosis Date    Depression     Diabetes mellitus ([facility]     [facility] immunodeficiency virus infection) ([facility]     Hypertension     MRSA (methicillin resistant staph aureus) culture positive 06/06/2018    leg       History reviewed. No pertinent surgical history. No current facility-administered medications for this encounter.     Current Outpatient Medications:     CHLORTHALIDONE PO, Take by mouth, Disp: , Rfl:     LOSARTAN POTASSIUM PO, Take by mouth, Disp: , Rfl:     metFORMIN (GLUCOPHAGE-XR) 500 MG extended release tablet, Take 1 tablet by mouth daily with breakfast for 2 weeks then increase to 2 tablets daily with breakfast., Disp: , Rfl:     melatonin 3 MG TABS tablet, Take 3 mg by mouth, Disp: , Rfl:     hydrochlorothiazide (MICROZIDE) 12.5 MG capsule, Take 12.5 mg by mouth, Disp: , Rfl:     amLODIPine (NORVASC) 10 MG tablet, Take 10 mg by mouth, Disp: , Rfl:     emtricitabine-rilpivirine-tenofovir alafenamide (ODEFSEY) 200-25-25 MG TABS per tablet, Take 1 tablet by mouth daily, Disp: , Rfl:     ibuprofen (IBU) 600 MG tablet, Take 1 tablet by mouth every 6 hours as needed for Pain, Disp: 40 tablet, Rfl: 0    Allergies   Allergen Reactions    Bactrim [Sulfamethoxazole-Trimethoprim]        Social History     Socioeconomic History    Marital status: Single     Spouse name: Not on file    Number of children: Not on file    Years of education: Not on file    Highest education level: Not on file   Occupational History    Not on file   Tobacco Use    Smoking status: Former Smoker     Packs/day: 0.50    Smokeless tobacco: Never Used   Substance and Sexual Activity    Alcohol use: Yes     Comment: rarely    Drug use: Not Currently     Types: Marijuana Veldon Bunting)     Comment: once on 5/16/2020    Sexual activity: Never   Other Topics Concern    Not on file   Social History Narrative    Not on file     Social Determinants of Health     Financial Resource Strain:     Difficulty of Paying Living Expenses: Not on file   Food Insecurity:     Worried About [address] in the Last Year: Not on file    [address] [employer] in the Last Year: Not on file   Transportation Needs:     Lack of Transportation (Medical): Not on file    Lack of Transportation (Non-Medical):  Not on file   Physical Activity:     Days of Exercise per Week: Not on file    Minutes of Exercise per Session: Not on file   Stress:     Feeling of Stress : Not on file   Social Connections:     Frequency of Communication with Friends and Family: Not on file    Frequency of Social Gatherings with Friends and Family: Not on file    Attends Caoda[employer] Services: Not on file    Active Member of [address] [place] [address] or Organizations: Not on file    Attends Club or Organization Meetings: Not on file    Marital Status: Not on file   Intimate Partner Violence:     Fear of Current or Ex-Partner: Not on file    Emotionally Abused: Not on file    Physically Abused: Not on file    Sexually Abused: Not on file   Housing Stability:     Unable to Pay for Housing in the Last Year: Not on file    Number of [place] in the Last Year: Not on file    Unstable Housing in the Last Year: Not on file       Nursing Notes Reviewed      ROS:  General: no fever  ENT: no sinus congestion, no sore throat  RESP: +cough, no shortness of breath  CARDIAC: no chest pain  GI: no abdominal pain, no vomiting, no diarrhea  : no dysuria, no hematuria, no retention, no incontinence  Musculoskeletal: no arthralgia, no myalgia, no back pain,  no joint swelling  NEURO: no headache, no numbness, no weakness, no dizziness  DERM: no rash, no erythema, no ecchymosis, no wounds      PHYSICAL EXAM:  GENERAL APPEARANCE: Jaydon Briceño is in no acute respiratory distress. Awake and alert. VITAL SIGNS:   ED Triage Vitals   Enc Vitals Group      BP 12/31/21 1731 136/77      Pulse 12/31/21 1723 86      Resp 12/31/21 1723 16      Temp 12/31/21 1723 98.2 °F (36.8 °C)      Temp Source 12/31/21 1723 Oral      SpO2 12/31/21 1723 98 %      Weight 12/31/21 1723 270 lb (122.5 kg)      Height --       Head Circumference --       Peak Flow --       Pain Score --       Pain Loc --       Pain Edu? --       Excl. in GC? --      HEAD: Normocephalic, atraumatic. EYES:  Extraocular muscles are intact. Conjunctivas are pink. Negative scleral icterus. ENT:  Mucous membranes are moist.  Pharynx without erythema or exudates. NECK: Nontender and supple. CHEST: Clear to auscultation bilaterally. No rales, rhonchi, or wheezing. HEART:  Regular rate and rhythm. No murmurs. Strong and equal pulses in the upper and lower extremities. ABDOMEN: Soft,  nondistended, positive bowel sounds. abdomen is nontender. MUSCULOSKELETAL:  Active range of motion of the upper and lower extremities. No edema. NEUROLOGICAL: Awake, alert and oriented x 3. Power intact in the upper and lower extremities. DERMATOLOGIC: No petechiae, rashes, or ecchymoses. ED COURSE AND MEDICAL DECISION MAKING:      Radiology:  All plain films have been evaluated by myself. They may have been overread by radiologist as noted in chart.  Other radiologic studies (i.e. CT, MRI, ultrasounds, etc ) have been interpreted by radiologist.     No orders to display       Labs:  No results found for this visit on 12/31/21. Treatment in the department:  Patient received   Medications   penicillin G benzathine (BICILLIN L-A) [id number] UNIT/2ML suspension 2.4 Million Units (2.4 Million Units IntraMUSCular Given 12/31/21 2059)         Medical decision making:  Patient with primary complaint of wanting syphillis treatment. No fever, confusion, headache, neurologic symptoms. Given PCN and advised to followup with his infectious disease doctor. covid test sent. No fever or shortness of breath or hypoxia so did not feel CXR necessary. Clinical Impression:  1. Cough    2. Early syphilis, primary syphilis        Dispo:  Patient will be discharged  at this time. Patient was informed of this decision and agrees with plan. I have discussed lab and xray findings with patient and they understand. Questions were answered to the best of my ability. Discharge vitals:  Blood pressure 136/77, pulse 86, temperature 98.2 °F (36.8 °C), temperature source Oral, resp. rate 16, weight 270 lb (122.5 kg), SpO2 98 %. Prescriptions given:   Discharge Medication List as of 12/31/2021  9:18 PM            This chart was created using dragon voice recognition software.         Sly Turner MD  01/01/22 1040

## 2024-02-12 ENCOUNTER — HOSPITAL ENCOUNTER (EMERGENCY)
Age: 29
Discharge: HOME | End: 2024-02-13
Attending: STUDENT IN AN ORGANIZED HEALTH CARE EDUCATION/TRAINING PROGRAM
Payer: MEDICAID

## 2024-02-12 DIAGNOSIS — H66.002 NON-RECURRENT ACUTE SUPPURATIVE OTITIS MEDIA OF LEFT EAR WITHOUT SPONTANEOUS RUPTURE OF TYMPANIC MEMBRANE: ICD-10-CM

## 2024-02-12 DIAGNOSIS — R20.2 PARESTHESIAS: ICD-10-CM

## 2024-02-12 DIAGNOSIS — J06.9 VIRAL URI WITH COUGH: Primary | ICD-10-CM

## 2024-02-12 PROCEDURE — 99283 EMERGENCY DEPT VISIT LOW MDM: CPT

## 2024-02-12 RX ORDER — DULAGLUTIDE 1.5 MG/.5ML
1.5 INJECTION, SOLUTION SUBCUTANEOUS WEEKLY
COMMUNITY

## 2024-02-13 VITALS
WEIGHT: 272.4 LBS | TEMPERATURE: 99.8 F | BODY MASS INDEX: 36.1 KG/M2 | HEIGHT: 73 IN | HEART RATE: 103 BPM | OXYGEN SATURATION: 100 % | SYSTOLIC BLOOD PRESSURE: 143 MMHG | RESPIRATION RATE: 12 BRPM | DIASTOLIC BLOOD PRESSURE: 75 MMHG

## 2024-02-13 LAB
FLUAV RNA UPPER RESP QL NAA+PROBE: NEGATIVE
FLUBV AG NPH QL: NEGATIVE
SARS-COV-2 RDRP RESP QL NAA+PROBE: NOT DETECTED

## 2024-02-13 PROCEDURE — 87804 INFLUENZA ASSAY W/OPTIC: CPT

## 2024-02-13 PROCEDURE — 87635 SARS-COV-2 COVID-19 AMP PRB: CPT

## 2024-02-13 PROCEDURE — 6370000000 HC RX 637 (ALT 250 FOR IP): Performed by: STUDENT IN AN ORGANIZED HEALTH CARE EDUCATION/TRAINING PROGRAM

## 2024-02-13 RX ORDER — AMOXICILLIN 875 MG/1
875 TABLET, COATED ORAL 2 TIMES DAILY
Qty: 10 TABLET | Refills: 0 | Status: SHIPPED | OUTPATIENT
Start: 2024-02-13 | End: 2024-02-18

## 2024-02-13 RX ORDER — AMOXICILLIN 250 MG/1
1000 CAPSULE ORAL ONCE
Status: DISCONTINUED | OUTPATIENT
Start: 2024-02-13 | End: 2024-02-13 | Stop reason: HOSPADM

## 2024-02-13 RX ORDER — IBUPROFEN 600 MG/1
600 TABLET ORAL ONCE
Status: COMPLETED | OUTPATIENT
Start: 2024-02-13 | End: 2024-02-13

## 2024-02-13 RX ADMIN — IBUPROFEN 600 MG: 600 TABLET, FILM COATED ORAL at 00:13

## 2024-02-13 NOTE — ED NOTES
Pt came to the nurse's desk asking for his nurse and his paperwork and he \"was in a real hurry\". I explained that she was in with another patient and we would get the paperwork printed and bring it to him. After printing AVS, I went to retrieve the ordered medication. When I got to the room, this patient had left the facility. I notified the physician that the patient had left without discharge paperwork or receiving his initial dose of antibiotic.

## 2024-02-13 NOTE — ED PROVIDER NOTES
[facility] EMERGENCY DEPARTMENT     EMERGENCY DEPARTMENT ENCOUNTER            Pt Name: Julián Flannery   MRN: [id number]   Birthdate 1995   Date of evaluation: 2/12/2024   Provider: Nona Lockwood MD   PCP: Andree Haji MD   Note Started: 11:21 PM EST 2/12/24          CHIEF COMPLAINT     Chief Complaint   Patient presents with    Cough    Numbness        HISTORY OF PRESENT ILLNESS:   History from : Patient   Limitations to history : None     Julián Flannery is a 28 y.o. male who presents complaining of headache, cough, paresthesias.  Patient states that symptoms been ongoing for the past day.  He denies any measured fevers at home.  Has been taking some over-the-counter medications without much relief of his symptoms.  He states that every time he coughs, he gets a headache.  He denies shortness of breath.  States that he has a pins and needle sensation in both his hands and his legs that started today as well.  Denies other complaints or concerns.     Nursing Notes were all reviewed and agreed with, or any disagreements were addressed in the HPI.     REVIEW OF SYSTEMS :    Positives and Pertinent negatives as per HPI.      MEDICAL HISTORY   has a past medical history of Depression, Diabetes mellitus (HCC), HIV (human immunodeficiency virus infection) (HCC), Hypertension, and MRSA (methicillin resistant staph aureus) culture positive (06/06/2018).    History reviewed. No pertinent surgical history.   CURRENTMEDICATIONS       Discharge Medication List as of 2/13/2024  1:34 AM        CONTINUE these medications which have NOT CHANGED    Details   dulaglutide (TRULICITY) 1.5 MG/0.5ML SC injection Inject 0.5 mLs into the skin once a weekHistorical Med      CHLORTHALIDONE PO Take by mouthHistorical Med      metFORMIN (GLUCOPHAGE-XR) 500 MG extended release tablet Take 1 tablet by mouth daily (with breakfast)Historical Med      amLODIPine (NORVASC) 10 MG tablet Take 10 mg by mouthHistorical Med

## 2024-02-13 NOTE — ED TRIAGE NOTES
Pt to ED c/o cough and numbness/tingling to his bilateral hands/lower legs that started today. Pt is a diabetic. Pt awake and alert.

## 2024-11-06 ENCOUNTER — HOSPITAL ENCOUNTER (INPATIENT)
Age: 29
LOS: 2 days | Discharge: HOME | DRG: 871 | End: 2024-11-08
Attending: EMERGENCY MEDICINE | Admitting: INTERNAL MEDICINE
Payer: COMMERCIAL

## 2024-11-06 ENCOUNTER — APPOINTMENT (OUTPATIENT)
Dept: GENERAL RADIOLOGY | Age: 29
DRG: 871 | End: 2024-11-06
Payer: COMMERCIAL

## 2024-11-06 DIAGNOSIS — J18.9 PNEUMONIA OF BOTH LUNGS DUE TO INFECTIOUS ORGANISM, UNSPECIFIED PART OF LUNG: ICD-10-CM

## 2024-11-06 DIAGNOSIS — E87.6 HYPOKALEMIA: ICD-10-CM

## 2024-11-06 DIAGNOSIS — N17.9 ACUTE KIDNEY INJURY (HCC): ICD-10-CM

## 2024-11-06 DIAGNOSIS — A41.9 SEPTICEMIA (HCC): Primary | ICD-10-CM

## 2024-11-06 DIAGNOSIS — E83.42 HYPOMAGNESEMIA: ICD-10-CM

## 2024-11-06 PROBLEM — Z86.14 HISTORY OF MRSA INFECTION: Status: ACTIVE | Noted: 2024-11-06

## 2024-11-06 PROBLEM — Z86.19 HISTORY OF SYPHILIS: Status: ACTIVE | Noted: 2024-11-06

## 2024-11-06 PROBLEM — E11.69 TYPE 2 DIABETES MELLITUS WITH OTHER SPECIFIED COMPLICATION (HCC): Status: ACTIVE | Noted: 2024-11-06

## 2024-11-06 PROBLEM — E66.812 CLASS 2 OBESITY DUE TO EXCESS CALORIES WITH BODY MASS INDEX (BMI) OF 35.0 TO 35.9 IN ADULT: Status: ACTIVE | Noted: 2024-11-06

## 2024-11-06 PROBLEM — E66.09 CLASS 2 OBESITY DUE TO EXCESS CALORIES WITH BODY MASS INDEX (BMI) OF 35.0 TO 35.9 IN ADULT: Status: ACTIVE | Noted: 2024-11-06

## 2024-11-06 PROBLEM — I10 ESSENTIAL HYPERTENSION: Status: ACTIVE | Noted: 2024-11-06

## 2024-11-06 PROBLEM — Z21 HIV POSITIVE (HCC): Status: ACTIVE | Noted: 2024-11-06

## 2024-11-06 PROBLEM — Z88.2 HISTORY OF ALLERGY TO SULFONAMIDES: Status: ACTIVE | Noted: 2024-11-06

## 2024-11-06 LAB
ALBUMIN SERPL-MCNC: 4.4 G/DL (ref 3.4–5)
ALBUMIN/GLOB SERPL: 1.3 {RATIO} (ref 1.1–2.2)
ALP SERPL-CCNC: 67 U/L (ref 40–129)
ALT SERPL-CCNC: 29 U/L (ref 10–40)
AMPHETAMINES UR QL SCN>1000 NG/ML: NORMAL
ANION GAP SERPL CALCULATED.3IONS-SCNC: 15 MMOL/L (ref 3–16)
AST SERPL-CCNC: 32 U/L (ref 15–37)
BARBITURATES UR QL SCN>200 NG/ML: NORMAL
BASOPHILS # BLD: 0.1 K/UL (ref 0–0.2)
BASOPHILS NFR BLD: 0.5 %
BENZODIAZ UR QL SCN>200 NG/ML: NORMAL
BILIRUB SERPL-MCNC: 0.6 MG/DL (ref 0–1)
BILIRUB UR QL STRIP.AUTO: NEGATIVE
BUN SERPL-MCNC: 11 MG/DL (ref 7–20)
C DIFF TOX A+B STL QL IA: NORMAL
CALCIUM SERPL-MCNC: 9.5 MG/DL (ref 8.3–10.6)
CANNABINOIDS UR QL SCN>50 NG/ML: NORMAL
CHLORIDE SERPL-SCNC: 98 MMOL/L (ref 99–110)
CK SERPL-CCNC: 525 U/L (ref 39–308)
CLARITY UR: CLEAR
CO2 SERPL-SCNC: 24 MMOL/L (ref 21–32)
COCAINE UR QL SCN: NORMAL
COLOR UR: ABNORMAL
CREAT SERPL-MCNC: 1.4 MG/DL (ref 0.9–1.3)
DEPRECATED RDW RBC AUTO: 14.1 % (ref 12.4–15.4)
DRUG SCREEN COMMENT UR-IMP: NORMAL
EKG ATRIAL RATE: 114 BPM
EKG DIAGNOSIS: NORMAL
EKG P AXIS: 52 DEGREES
EKG P-R INTERVAL: 130 MS
EKG Q-T INTERVAL: 294 MS
EKG QRS DURATION: 78 MS
EKG QTC CALCULATION (BAZETT): 405 MS
EKG R AXIS: 83 DEGREES
EKG T AXIS: -41 DEGREES
EKG VENTRICULAR RATE: 114 BPM
EOSINOPHIL # BLD: 0 K/UL (ref 0–0.6)
EOSINOPHIL NFR BLD: 0 %
FENTANYL SCREEN, URINE: NORMAL
FLUAV RNA RESP QL NAA+PROBE: NOT DETECTED
FLUBV RNA RESP QL NAA+PROBE: NOT DETECTED
GFR SERPLBLD CREATININE-BSD FMLA CKD-EPI: 69 ML/MIN/{1.73_M2}
GLUCOSE BLD-MCNC: 104 MG/DL (ref 70–99)
GLUCOSE BLD-MCNC: 183 MG/DL (ref 70–99)
GLUCOSE BLD-MCNC: 97 MG/DL (ref 70–99)
GLUCOSE SERPL-MCNC: 161 MG/DL (ref 70–99)
GLUCOSE UR STRIP.AUTO-MCNC: NEGATIVE MG/DL
HCT VFR BLD AUTO: 44 % (ref 40.5–52.5)
HGB BLD-MCNC: 14.3 G/DL (ref 13.5–17.5)
HGB UR QL STRIP.AUTO: NEGATIVE
KETONES UR STRIP.AUTO-MCNC: ABNORMAL MG/DL
LACTATE BLDV-SCNC: 1.8 MMOL/L (ref 0.4–1.9)
LACTATE BLDV-SCNC: 2.3 MMOL/L (ref 0.4–1.9)
LEUKOCYTE ESTERASE UR QL STRIP.AUTO: NEGATIVE
LYMPHOCYTES # BLD: 0.8 K/UL (ref 1–5.1)
LYMPHOCYTES NFR BLD: 5.9 %
MAGNESIUM SERPL-MCNC: 1.6 MG/DL (ref 1.8–2.4)
MCH RBC QN AUTO: 25.7 PG (ref 26–34)
MCHC RBC AUTO-ENTMCNC: 32.4 G/DL (ref 31–36)
MCV RBC AUTO: 79.3 FL (ref 80–100)
METHADONE UR QL SCN>300 NG/ML: NORMAL
MONOCYTES # BLD: 1 K/UL (ref 0–1.3)
MONOCYTES NFR BLD: 7.1 %
NEUTROPHILS # BLD: 11.9 K/UL (ref 1.7–7.7)
NEUTROPHILS NFR BLD: 86.5 %
NITRITE UR QL STRIP.AUTO: NEGATIVE
NT-PROBNP SERPL-MCNC: <36 PG/ML (ref 0–124)
OPIATES UR QL SCN>300 NG/ML: NORMAL
OXYCODONE UR QL SCN: NORMAL
PCP UR QL SCN>25 NG/ML: NORMAL
PERFORMED ON: ABNORMAL
PERFORMED ON: ABNORMAL
PERFORMED ON: NORMAL
PH UR STRIP.AUTO: 5.5 [PH] (ref 5–8)
PH UR STRIP: 5 [PH]
PLATELET # BLD AUTO: 286 K/UL (ref 135–450)
PMV BLD AUTO: 8.1 FL (ref 5–10.5)
POTASSIUM SERPL-SCNC: 3.1 MMOL/L (ref 3.5–5.1)
PROCALCITONIN SERPL IA-MCNC: 0.08 NG/ML (ref 0–0.15)
PROT SERPL-MCNC: 7.9 G/DL (ref 6.4–8.2)
PROT UR STRIP.AUTO-MCNC: NEGATIVE MG/DL
RBC # BLD AUTO: 5.55 M/UL (ref 4.2–5.9)
REPORT: NORMAL
RESP PATH DNA+RNA PNL NPH NAA+NON-PROBE: NORMAL
SARS-COV-2 RNA RESP QL NAA+PROBE: NOT DETECTED
SODIUM SERPL-SCNC: 137 MMOL/L (ref 136–145)
SP GR UR STRIP.AUTO: 1.02 (ref 1–1.03)
UA COMPLETE W REFLEX CULTURE PNL UR: ABNORMAL
UA DIPSTICK W REFLEX MICRO PNL UR: ABNORMAL
URN SPEC COLLECT METH UR: ABNORMAL
UROBILINOGEN UR STRIP-ACNC: 0.2 E.U./DL
WBC # BLD AUTO: 13.8 K/UL (ref 4–11)

## 2024-11-06 PROCEDURE — 84145 PROCALCITONIN (PCT): CPT

## 2024-11-06 PROCEDURE — 2580000003 HC RX 258: Performed by: EMERGENCY MEDICINE

## 2024-11-06 PROCEDURE — 1200000000 HC SEMI PRIVATE

## 2024-11-06 PROCEDURE — 82550 ASSAY OF CK (CPK): CPT

## 2024-11-06 PROCEDURE — 87077 CULTURE AEROBIC IDENTIFY: CPT

## 2024-11-06 PROCEDURE — 0202U NFCT DS 22 TRGT SARS-COV-2: CPT

## 2024-11-06 PROCEDURE — 96361 HYDRATE IV INFUSION ADD-ON: CPT

## 2024-11-06 PROCEDURE — 6370000000 HC RX 637 (ALT 250 FOR IP): Performed by: INTERNAL MEDICINE

## 2024-11-06 PROCEDURE — 99223 1ST HOSP IP/OBS HIGH 75: CPT | Performed by: INTERNAL MEDICINE

## 2024-11-06 PROCEDURE — 99285 EMERGENCY DEPT VISIT HI MDM: CPT

## 2024-11-06 PROCEDURE — 87506 IADNA-DNA/RNA PROBE TQ 6-11: CPT

## 2024-11-06 PROCEDURE — 6360000002 HC RX W HCPCS: Performed by: EMERGENCY MEDICINE

## 2024-11-06 PROCEDURE — 87324 CLOSTRIDIUM AG IA: CPT

## 2024-11-06 PROCEDURE — 80053 COMPREHEN METABOLIC PANEL: CPT

## 2024-11-06 PROCEDURE — 83605 ASSAY OF LACTIC ACID: CPT

## 2024-11-06 PROCEDURE — 93010 ELECTROCARDIOGRAM REPORT: CPT | Performed by: INTERNAL MEDICINE

## 2024-11-06 PROCEDURE — 6360000002 HC RX W HCPCS: Performed by: INTERNAL MEDICINE

## 2024-11-06 PROCEDURE — 6370000000 HC RX 637 (ALT 250 FOR IP): Performed by: EMERGENCY MEDICINE

## 2024-11-06 PROCEDURE — 83735 ASSAY OF MAGNESIUM: CPT

## 2024-11-06 PROCEDURE — 83880 ASSAY OF NATRIURETIC PEPTIDE: CPT

## 2024-11-06 PROCEDURE — 81003 URINALYSIS AUTO W/O SCOPE: CPT

## 2024-11-06 PROCEDURE — 71045 X-RAY EXAM CHEST 1 VIEW: CPT

## 2024-11-06 PROCEDURE — 87181 SC STD AGAR DILUTION PER AGT: CPT

## 2024-11-06 PROCEDURE — 85025 COMPLETE CBC W/AUTO DIFF WBC: CPT

## 2024-11-06 PROCEDURE — 96374 THER/PROPH/DIAG INJ IV PUSH: CPT

## 2024-11-06 PROCEDURE — 87636 SARSCOV2 & INF A&B AMP PRB: CPT

## 2024-11-06 PROCEDURE — 87040 BLOOD CULTURE FOR BACTERIA: CPT

## 2024-11-06 PROCEDURE — 2580000003 HC RX 258: Performed by: HOSPITALIST

## 2024-11-06 PROCEDURE — 87641 MR-STAPH DNA AMP PROBE: CPT

## 2024-11-06 PROCEDURE — 93005 ELECTROCARDIOGRAM TRACING: CPT | Performed by: EMERGENCY MEDICINE

## 2024-11-06 PROCEDURE — 87186 SC STD MICRODIL/AGAR DIL: CPT

## 2024-11-06 PROCEDURE — 80307 DRUG TEST PRSMV CHEM ANLYZR: CPT

## 2024-11-06 PROCEDURE — 87449 NOS EACH ORGANISM AG IA: CPT

## 2024-11-06 RX ORDER — DEXTROSE MONOHYDRATE 100 MG/ML
INJECTION, SOLUTION INTRAVENOUS CONTINUOUS PRN
Status: DISCONTINUED | OUTPATIENT
Start: 2024-11-06 | End: 2024-11-08 | Stop reason: HOSPADM

## 2024-11-06 RX ORDER — SODIUM CHLORIDE, SODIUM LACTATE, POTASSIUM CHLORIDE, CALCIUM CHLORIDE 600; 310; 30; 20 MG/100ML; MG/100ML; MG/100ML; MG/100ML
INJECTION, SOLUTION INTRAVENOUS CONTINUOUS
Status: DISCONTINUED | OUTPATIENT
Start: 2024-11-06 | End: 2024-11-06

## 2024-11-06 RX ORDER — SODIUM CHLORIDE 0.9 % (FLUSH) 0.9 %
5-40 SYRINGE (ML) INJECTION EVERY 12 HOURS SCHEDULED
Status: DISCONTINUED | OUTPATIENT
Start: 2024-11-06 | End: 2024-11-08 | Stop reason: HOSPADM

## 2024-11-06 RX ORDER — ONDANSETRON 2 MG/ML
4 INJECTION INTRAMUSCULAR; INTRAVENOUS EVERY 6 HOURS PRN
Status: DISCONTINUED | OUTPATIENT
Start: 2024-11-06 | End: 2024-11-08 | Stop reason: HOSPADM

## 2024-11-06 RX ORDER — LOSARTAN POTASSIUM 25 MG/1
25 TABLET ORAL DAILY
Status: ON HOLD | COMMUNITY
End: 2024-11-08 | Stop reason: HOSPADM

## 2024-11-06 RX ORDER — ONDANSETRON 4 MG/1
4 TABLET, ORALLY DISINTEGRATING ORAL EVERY 8 HOURS PRN
Status: DISCONTINUED | OUTPATIENT
Start: 2024-11-06 | End: 2024-11-08 | Stop reason: HOSPADM

## 2024-11-06 RX ORDER — POTASSIUM CHLORIDE 1500 MG/1
40 TABLET, EXTENDED RELEASE ORAL ONCE
Status: COMPLETED | OUTPATIENT
Start: 2024-11-06 | End: 2024-11-06

## 2024-11-06 RX ORDER — SODIUM CHLORIDE 0.9 % (FLUSH) 0.9 %
5-40 SYRINGE (ML) INJECTION PRN
Status: DISCONTINUED | OUTPATIENT
Start: 2024-11-06 | End: 2024-11-08 | Stop reason: HOSPADM

## 2024-11-06 RX ORDER — INSULIN LISPRO 100 [IU]/ML
0-4 INJECTION, SOLUTION INTRAVENOUS; SUBCUTANEOUS
Status: DISCONTINUED | OUTPATIENT
Start: 2024-11-06 | End: 2024-11-08 | Stop reason: HOSPADM

## 2024-11-06 RX ORDER — GLUCAGON 1 MG/ML
1 KIT INJECTION PRN
Status: DISCONTINUED | OUTPATIENT
Start: 2024-11-06 | End: 2024-11-08 | Stop reason: HOSPADM

## 2024-11-06 RX ORDER — SODIUM CHLORIDE 9 MG/ML
INJECTION, SOLUTION INTRAVENOUS PRN
Status: DISCONTINUED | OUTPATIENT
Start: 2024-11-06 | End: 2024-11-08 | Stop reason: HOSPADM

## 2024-11-06 RX ORDER — ACETAMINOPHEN 650 MG/1
650 SUPPOSITORY RECTAL EVERY 6 HOURS PRN
Status: DISCONTINUED | OUTPATIENT
Start: 2024-11-06 | End: 2024-11-08 | Stop reason: HOSPADM

## 2024-11-06 RX ORDER — CALCIUM CARBONATE 300MG(750)
10 TABLET,CHEWABLE ORAL NIGHTLY PRN
COMMUNITY

## 2024-11-06 RX ORDER — 0.9 % SODIUM CHLORIDE 0.9 %
1000 INTRAVENOUS SOLUTION INTRAVENOUS ONCE
Status: DISCONTINUED | OUTPATIENT
Start: 2024-11-06 | End: 2024-11-06

## 2024-11-06 RX ORDER — SODIUM CHLORIDE 9 MG/ML
INJECTION, SOLUTION INTRAVENOUS CONTINUOUS
Status: DISCONTINUED | OUTPATIENT
Start: 2024-11-06 | End: 2024-11-07

## 2024-11-06 RX ORDER — IBUPROFEN 400 MG/1
400 TABLET, FILM COATED ORAL ONCE
Status: COMPLETED | OUTPATIENT
Start: 2024-11-06 | End: 2024-11-06

## 2024-11-06 RX ORDER — ENOXAPARIN SODIUM 100 MG/ML
30 INJECTION SUBCUTANEOUS 2 TIMES DAILY
Status: DISCONTINUED | OUTPATIENT
Start: 2024-11-06 | End: 2024-11-08 | Stop reason: HOSPADM

## 2024-11-06 RX ORDER — ACETAMINOPHEN 325 MG/1
650 TABLET ORAL ONCE
Status: COMPLETED | OUTPATIENT
Start: 2024-11-06 | End: 2024-11-06

## 2024-11-06 RX ORDER — ACETAMINOPHEN 325 MG/1
650 TABLET ORAL EVERY 6 HOURS PRN
Status: DISCONTINUED | OUTPATIENT
Start: 2024-11-06 | End: 2024-11-08 | Stop reason: HOSPADM

## 2024-11-06 RX ORDER — LINEZOLID 2 MG/ML
600 INJECTION, SOLUTION INTRAVENOUS EVERY 12 HOURS
Status: DISCONTINUED | OUTPATIENT
Start: 2024-11-06 | End: 2024-11-07

## 2024-11-06 RX ORDER — LEVOFLOXACIN 5 MG/ML
750 INJECTION, SOLUTION INTRAVENOUS EVERY 24 HOURS
Status: DISCONTINUED | OUTPATIENT
Start: 2024-11-06 | End: 2024-11-08 | Stop reason: HOSPADM

## 2024-11-06 RX ORDER — 0.9 % SODIUM CHLORIDE 0.9 %
30 INTRAVENOUS SOLUTION INTRAVENOUS ONCE
Status: COMPLETED | OUTPATIENT
Start: 2024-11-06 | End: 2024-11-06

## 2024-11-06 RX ORDER — MAGNESIUM SULFATE 1 G/100ML
1000 INJECTION INTRAVENOUS ONCE
Status: COMPLETED | OUTPATIENT
Start: 2024-11-06 | End: 2024-11-06

## 2024-11-06 RX ADMIN — WATER 1000 MG: 1 INJECTION INTRAMUSCULAR; INTRAVENOUS; SUBCUTANEOUS at 07:32

## 2024-11-06 RX ADMIN — MAGNESIUM SULFATE HEPTAHYDRATE 1000 MG: 1 INJECTION, SOLUTION INTRAVENOUS at 13:12

## 2024-11-06 RX ADMIN — ACETAMINOPHEN 650 MG: 325 TABLET ORAL at 07:12

## 2024-11-06 RX ADMIN — LINEZOLID 600 MG: 600 INJECTION, SOLUTION INTRAVENOUS at 17:56

## 2024-11-06 RX ADMIN — SODIUM CHLORIDE: 9 INJECTION, SOLUTION INTRAVENOUS at 13:10

## 2024-11-06 RX ADMIN — ACETAMINOPHEN 650 MG: 325 TABLET ORAL at 20:58

## 2024-11-06 RX ADMIN — IBUPROFEN 400 MG: 400 TABLET, FILM COATED ORAL at 07:12

## 2024-11-06 RX ADMIN — AZITHROMYCIN MONOHYDRATE 500 MG: 500 INJECTION, POWDER, LYOPHILIZED, FOR SOLUTION INTRAVENOUS at 15:40

## 2024-11-06 RX ADMIN — SODIUM CHLORIDE 2397 ML: 9 INJECTION, SOLUTION INTRAVENOUS at 07:06

## 2024-11-06 RX ADMIN — ENOXAPARIN SODIUM 30 MG: 100 INJECTION SUBCUTANEOUS at 20:58

## 2024-11-06 RX ADMIN — POTASSIUM CHLORIDE 40 MEQ: 1500 TABLET, EXTENDED RELEASE ORAL at 13:09

## 2024-11-06 RX ADMIN — LEVOFLOXACIN 750 MG: 5 INJECTION, SOLUTION INTRAVENOUS at 21:03

## 2024-11-06 ASSESSMENT — ENCOUNTER SYMPTOMS
RHINORRHEA: 0
WHEEZING: 0
COUGH: 0
DIARRHEA: 0
EYE REDNESS: 0
SINUS PAIN: 0
NAUSEA: 0
SHORTNESS OF BREATH: 0
ABDOMINAL PAIN: 0
EYE DISCHARGE: 0
BACK PAIN: 0
CONSTIPATION: 0
SINUS PRESSURE: 0
SORE THROAT: 0

## 2024-11-06 ASSESSMENT — PAIN SCALES - GENERAL
PAINLEVEL_OUTOF10: 3
PAINLEVEL_OUTOF10: 5
PAINLEVEL_OUTOF10: 0

## 2024-11-06 ASSESSMENT — PAIN DESCRIPTION - LOCATION
LOCATION: HEAD
LOCATION: GENERALIZED

## 2024-11-06 ASSESSMENT — PAIN - FUNCTIONAL ASSESSMENT
PAIN_FUNCTIONAL_ASSESSMENT: ACTIVITIES ARE NOT PREVENTED
PAIN_FUNCTIONAL_ASSESSMENT: 0-10

## 2024-11-06 ASSESSMENT — PAIN DESCRIPTION - ORIENTATION: ORIENTATION: MID

## 2024-11-06 ASSESSMENT — PAIN SCALES - WONG BAKER: WONGBAKER_NUMERICALRESPONSE: NO HURT

## 2024-11-06 ASSESSMENT — PAIN DESCRIPTION - DESCRIPTORS: DESCRIPTORS: ACHING

## 2024-11-06 ASSESSMENT — LIFESTYLE VARIABLES
HOW MANY STANDARD DRINKS CONTAINING ALCOHOL DO YOU HAVE ON A TYPICAL DAY: PATIENT DOES NOT DRINK
HOW OFTEN DO YOU HAVE A DRINK CONTAINING ALCOHOL: MONTHLY OR LESS
HOW MANY STANDARD DRINKS CONTAINING ALCOHOL DO YOU HAVE ON A TYPICAL DAY: 1 OR 2
HOW OFTEN DO YOU HAVE A DRINK CONTAINING ALCOHOL: NEVER

## 2024-11-06 ASSESSMENT — PAIN DESCRIPTION - PAIN TYPE: TYPE: ACUTE PAIN

## 2024-11-06 NOTE — ED PROVIDER NOTES
Emergency Department Encounter    Patient: Julián Flannery  MRN: [id number]  : 1995  Date of Evaluation: 2024  ED Provider:  Donna Garcia DO    Triage Chief Complaint:   No chief complaint on file.    [place]:  Julián Flannery is a 28 y.o. male with history of HIV, hypertension, diabetes, depression that presents to the emergency department complaining of bodyaches.  Patient states around 2:00 this morning he got up and his arms and legs were aching.  Patient states he had chills felt warm could not get warmed up.  Patient states he started having a headache.  He states hard to move his extremities due to the body aches.  Patient states he was fine yesterday.  He states he felt nauseous and started having diarrhea.  Patient states some abdominal cramping.  He denies any chest pain shortness of breath no cough no dysuria hematuria he states he was not dizzy or lightheaded no sick contacts.  He denies any sore throat runny nose or earache.  Patient here for evaluation.    ROS - see HPI, below listed is current ROS at time of my eval:  10 systems reviewed and negative except as above.     Past Medical History:   Diagnosis Date    Depression     Diabetes mellitus (HCC)     HIV (human immunodeficiency virus infection) (HCC)     Hypertension     MRSA (methicillin resistant staph aureus) culture positive 2018    leg     History reviewed. No pertinent surgical history.  History reviewed. No pertinent family history.  Social History     Socioeconomic History    Marital status: Single     Spouse name: Not on file    Number of children: Not on file    Years of education: Not on file    Highest education level: Not on file   Occupational History    Not on file   Tobacco Use    Smoking status: Every Day     Current packs/day: 0.50     Types: Cigarettes    Smokeless tobacco: Never   Substance and Sexual Activity    Alcohol use: Yes     Comment: rarely    Drug use: Not Currently     Types: Marijuana  Making, Pt Expectation of Test or Tx.):            Discussion with Other Profesionals : Admitting Team Hospitalist      I am the Primary Clinician of Record.    Is this patient to be included in the SEP-1 Core Measure due to severe sepsis or septic shock?   Yes   SEP-1 CORE MEASURE DATA      Sepsis Criteria   Severe Sepsis Criteria   Septic Shock Criteria     Must be confirmed or suspected to move forward with diagnosis of sepsis.    Must meet 2:    [x] Temperature > 100.9 F (38.3 C)        or < 96.8 F (36 C)  [x] HR > 90  [x] RR > 20  [x] WBC > 12 or < 4 or 10% bands      AND:      [x] Infection Confirmed or        Suspected.     Must meet 1:    [x] Lactate > 2       or   [] Signs of Organ Dysfunction:    - SBP < 90 or MAP < 65  - Altered mental status  - Creatinine > 2 or increased from      baseline  - Urine Output < 0.5 ml/kg/hr  - Bilirubin > 2  - INR > 1.5 (not anticoagulated)  - Platelets < 100,000  - Acute Respiratory Failure as     evidenced by new need for NIPPV     or mechanical ventilation      [] No criteria met for Severe Sepsis.   Must meet 1:    [] Lactate > 4        or   [] SBP < 90 or MAP < 65 for at        least two readings in the first        hour after fluid bolus        administration      [] Vasopressors initiated (if hypotension persists after fluid resuscitation)        [] No criteria met for Septic Shock.   Patient Vitals for the past 6 hrs:   BP Temp Pulse Resp SpO2 Height Weight Percent Weight Change   11/06/24 0630 (!) 126/59 (!) 103.5 °F (39.7 °C) (!) 130 18 96 % -- -- --   11/06/24 0638 -- -- -- -- -- -- 123.6 kg (272 lb 7.8 oz) 0   11/06/24 0703 (!) 145/78 -- (!) 116 21 -- -- -- --   11/06/24 0721 -- -- (!) 112 18 100 % -- -- --   11/06/24 0723 -- -- (!) 113 28 100 % -- -- --   11/06/24 0726 -- -- (!) 111 24 100 % -- -- --   11/06/24 0742 -- -- -- -- -- 1.854 m (6' 1\") -- --   11/06/24 0743 121/72 -- (!) 118 20 100 % -- -- --   11/06/24 0744 -- -- (!) 114 21 100 % -- -- --

## 2024-11-06 NOTE — PROGRESS NOTES
Patient seen in ED, room 11.  Admission completed with the following exceptions:  4 Eyes Assessment, Immunizations, Vaccines, Rights and Responsibilities, Orientation to room, Plan of Care, Education/Learning Assessment and Education Plan, white board, height and weight, pain assessment and head to toe assessment.  Patient is alert and oriented X 4.  Patient lives in house with his sister and is being admitted for Sepsis.  Home Medication reconciliation will be/has been completed by Pharmacy Staff.  All patient's and/or family's questions answered.     Patient reports diarrhea orders placed for C. Diff rule out and contact precautions placed.

## 2024-11-06 NOTE — ED NOTES
How does patient ambulate?   []Low Fall Risk (ambulates by themselves without support)  []Stand by assist   [x]Contact Guard   []Front wheel walker  [x]Wheelchair   []Steady  []Bed bound  []History of Lower Extremity Amputation  []Unknown, did not assess in the emergency department   How does patient take pills?  [x]Whole with Water  []Crushed in applesauce  []Crushed in pudding  []Other  []Unknown no oral medications were given in the ED  Is patient alert?   [x]Alert  []Drowsy but responds to voice  []Doesn't respond to voice but responds to painful stimuli  []Unresponsive  Is patient oriented?   [x]To person  [x]To place  [x]To time  [x]To situation  []Confused  []Agitated  [x]Follows commands  If patient is disoriented or from a Skill Nursing Facility has family been notified of admission?   []Yes   [x]No  Patient belongings?   [x]Cell phone  []Wallet   []Dentures  [x]Clothing  Any specific patient or family belongings/needs/dynamics?     Miscellaneous comments/pending orders?   Pt arrives from home today. Pt states that around 2am this morning, he started to have body aches. Pt states that he was okay yesterday. Pt presents with fever of 103.5F oral and HR's 130s. Pt's temp is now 99.5 and HR is in the 's. Pt has received 2L of NS and abx were given. Pt is AxOx4. This RN used a wheelchair to take pt to restroom. Pt states that he was feeling tired.     If there are any additional questions please reach out to the Emergency Department.

## 2024-11-06 NOTE — ACP (ADVANCE CARE PLANNING)
Advance Care Planning Note       Purpose of Encounter: Advanced care planning in light of hospitalization for sepsis/IAM  Parties in attendance: :Julián Flannery, SHERI DIAZ MD  Decisional Capacity:Yes  Objective: This 28 y.o. male  with PMHx of HIV, diabetes mellitus, hypertension and depression who presented with generalized bodyaches and pain.   Goals of Care Determinations: Pt wants full support measures including CPR, intubation, trach, PEG tube, dialysis   Code Status: Full  Time Spent on Advanced Planning Documents: 16 mins.  Advanced Care Planning Documents:   Completed advances directives, advanced directives in chart.      Electronically signed by SHERI DIAZ MD on 11/6/2024 at 1:29 PM

## 2024-11-06 NOTE — CONSULTS
[facility]   Nephrology progress Note  [phone]  [phone]   Editorially         Reason for Consult:  IAM    HISTORY OF PRESENT ILLNESS:      The patient is a 28 y.o. male with significant past medical history of hypertension, diabetes, depression, HIV who presents with c/o body aches. Noted to have sepsis 2/2 pneumonia. Received IVF bolus on admission. Nephrology is consulted for IAM.   Lactic acidosis resolved post IVF.   Is hypokalemic.         Past Medical History:        Diagnosis Date    Depression     Diabetes mellitus (HCC)     on Trulicity    HIV (human immunodeficiency virus infection) (HCC)     Hypertension     MRSA (methicillin resistant staph aureus) culture positive 06/06/2018    leg       Past Surgical History:        Procedure Laterality Date    WISDOM TOOTH EXTRACTION         Current Medications:    No current facility-administered medications on file prior to encounter.     Current Outpatient Medications on File Prior to Encounter   Medication Sig Dispense Refill    losartan (COZAAR) 25 MG tablet Take 1 tablet by mouth daily      Melatonin 10 MG TBDP Take 10 mg by mouth nightly as needed (sleep)      dulaglutide (TRULICITY) 1.5 MG/0.5ML SC injection Inject 0.5 mLs into the skin once a week On Sundays      CHLORTHALIDONE PO Take 25 mg by mouth daily      metFORMIN (GLUCOPHAGE-XR) 500 MG extended release tablet Take 1 tablet by mouth daily (with breakfast)      amLODIPine (NORVASC) 10 MG tablet Take 1 tablet by mouth      emtricitabine-rilpivirine-tenofovir alafenamide (ODEFSEY) 200-25-25 MG TABS per tablet Take 1 tablet by mouth daily         Allergies:  Bactrim [sulfamethoxazole-trimethoprim]    Social History:    Social History     Socioeconomic History    Marital status: Single     Spouse name: Not on file    Number of children: 0    Years of education: 12    Highest education level: Not on file   Occupational History    Occupation: behavorial health specialist

## 2024-11-06 NOTE — ED TRIAGE NOTES
Pt complaining of his body feeling cold and tingly.  States he has felt like this since about 0200.  States this feeling has kept him from sleeping.

## 2024-11-06 NOTE — PROGRESS NOTES
Pharmacy Home Medication Reconciliation Note    A medication reconciliation has been completed for Julián Flannery 1995    Pharmacy: [facility]  Information provided by: Patient and fill history     The patient's home medication list is as follows:  No current facility-administered medications on file prior to encounter.     Current Outpatient Medications on File Prior to Encounter   Medication Sig Dispense Refill    losartan (COZAAR) 25 MG tablet Take 1 tablet by mouth daily      Melatonin 10 MG TBDP Take 10 mg by mouth nightly as needed (sleep)      dulaglutide (TRULICITY) 1.5 MG/0.5ML SC injection Inject 0.5 mLs into the skin once a week On Sundays      CHLORTHALIDONE PO Take 25 mg by mouth daily      metFORMIN (GLUCOPHAGE-XR) 500 MG extended release tablet Take 1 tablet by mouth daily (with breakfast)      amLODIPine (NORVASC) 10 MG tablet Take 1 tablet by mouth      emtricitabine-rilpivirine-tenofovir alafenamide (ODEFSEY) 200-25-25 MG TABS per tablet Take 1 tablet by mouth daily         Timing of last doses updated.    Thank you,  Shiloh Humphreys, PharmD  PGY-1 Pharmacy Resident

## 2024-11-06 NOTE — CONSULTS
Infectious Diseases   Consult Note        Admission Date: 11/6/2024  Hospital Day: Hospital Day: 1   Attending: Cparice Obrien MD  Date of service: 11/6/24     Reason for admission: Sepsis (HCC) [A41.9]    Chief complaint/ Reason for consult: Sepsis    Microbiology:        I have reviewed allavailable micro lab data and cultures    Results       Procedure Component Value Units Date/Time    Clostridium difficile toxin/antigen [[id number]]     Order Status: Sent Specimen: Stool     MRSA DNA Probe, Nasal [[id number]]     Order Status: Sent Specimen: Nares     Respiratory Panel, Molecular, with COVID-19 (Restricted: peds pts or suitable admitted adults) [[id number]]     Order Status: Sent Specimen: Nasopharyngeal     GI Bacterial Pathogens By PCR [[id number]]     Order Status: Sent Specimen: Stool     Clostridium difficile toxin/antigen [[id number]]     Order Status: Canceled Specimen: Stool     COVID-19 & Influenza Combo [[id number]] Collected: 11/06/24 0717    Order Status: Completed Specimen: Nasopharyngeal Swab Updated: 11/06/24 0745     SARS-CoV-2 RNA, RT PCR NOT DETECTED     Comment: Not Detected results do not preclude SARS-CoV-2 infection and  should not be used as the sole basis for patient management  decisions.  Results must be combined with clinical observations,  patient history, and epidemiological information.  Testing was performed using QASIM ANN SARS-CoV-2 and Influenza A/B  nucleic acid assay. This test is a multiplex Real-Time Reverse  Transcriptase Polymerase Chain Reaction (RT-PCR)-based in vitro  diagnostic test intended for the qualitative detection of nucleic  acids from SARS-CoV-2, influenza A, and influenza B in nasopharyngeal  and nasal swab specimens for use under the FDA’s Emergency Use  Authorization (EUA) only.    Patient Fact Sheet:  https://www.fda.gov/media/764419/download  Provider Fact Sheet: https://www.fda.gov/media/112435/download  EUA:    CREATININE 1.4*   CALCIUM 9.5   GLUCOSE 161*        Hepatic FunctionPanel:   Lab Results   Component Value Date/Time    ALKPHOS 67 11/06/2024 06:53 AM    ALT 29 11/06/2024 06:53 AM    AST 32 11/06/2024 06:53 AM    BILITOT 0.6 11/06/2024 06:53 AM       CPK: No results found for: \"CKTOTAL\"  ESR: No results found for: \"SEDRATE\"  CRP: No results found for: \"CRP\"      Imaging:    All pertinent images and reports for the current visit were reviewed by me during this visit.  I reviewed the chest x-ray/CT scan/MRI images and independently interpreted the findings and results today.    XR CHEST PORTABLE   Final Result   Increasing perihilar opacities centrally. This is suspected to represent mild   vascular congestion.  An atypical viral infection may be considered.             Outside records:    Labs, Microbiology, Radiology and pertinent results from Care everywhere, if available, were reviewed as a part ofthe consultation.      Problem list:       Patient Active Problem List   Diagnosis Code    Sepsis ([facility]) A41.9    Class 2 obesity due to excess calories with body mass index (BMI) of 35.0 to 35.9 in adult E66.812, E66.09, Z68.35    Type 2 diabetes mellitus with other specified complication ([facility]) E11.69    Essential hypertension I10    History of allergy to sulfonamides Z88.2    History of syphilis Z86.19    HIV positive ([facility]) Z21    History of MRSA infection Z86.14    Atypical pneumonia J18.9    Acute kidney injury ([facility]) N17.9    Hypokalemia E87.6    Hypomagnesemia E83.42    Pneumonia of both lungs due to infectious organism J18.9         Please note that this chart was generated using Dragon dictation software. Although every effort was made to ensure the accuracy of this automated transcription, some errors in transcription may have occurred inadvertently. If you may need any clarification, please do not hesitate to contact me through EPIC or at the phone number provided below with my electronic signature.  Any

## 2024-11-07 PROBLEM — A03.9 SHIGELLA ENTERITIS: Status: ACTIVE | Noted: 2024-11-07

## 2024-11-07 LAB
ALBUMIN SERPL-MCNC: 4.1 G/DL (ref 3.4–5)
ANION GAP SERPL CALCULATED.3IONS-SCNC: 11 MMOL/L (ref 3–16)
BASOPHILS # BLD: 0 K/UL (ref 0–0.2)
BASOPHILS NFR BLD: 0.4 %
BUN SERPL-MCNC: 8 MG/DL (ref 7–20)
CALCIUM SERPL-MCNC: 8.6 MG/DL (ref 8.3–10.6)
CHLORIDE SERPL-SCNC: 98 MMOL/L (ref 99–110)
CO2 SERPL-SCNC: 25 MMOL/L (ref 21–32)
CREAT SERPL-MCNC: 1 MG/DL (ref 0.9–1.3)
DEPRECATED RDW RBC AUTO: 14.4 % (ref 12.4–15.4)
EOSINOPHIL # BLD: 0 K/UL (ref 0–0.6)
EOSINOPHIL NFR BLD: 0.1 %
EST. AVERAGE GLUCOSE BLD GHB EST-MCNC: 137 MG/DL
GFR SERPLBLD CREATININE-BSD FMLA CKD-EPI: >90 ML/MIN/{1.73_M2}
GLUCOSE BLD-MCNC: 116 MG/DL (ref 70–99)
GLUCOSE BLD-MCNC: 122 MG/DL (ref 70–99)
GLUCOSE BLD-MCNC: 130 MG/DL (ref 70–99)
GLUCOSE BLD-MCNC: 165 MG/DL (ref 70–99)
GLUCOSE SERPL-MCNC: 115 MG/DL (ref 70–99)
HBA1C MFR BLD: 6.4 %
HCT VFR BLD AUTO: 41.7 % (ref 40.5–52.5)
HGB BLD-MCNC: 14 G/DL (ref 13.5–17.5)
LEGIONELLA AG UR QL: NORMAL
LYMPHOCYTES # BLD: 1.5 K/UL (ref 1–5.1)
LYMPHOCYTES NFR BLD: 19.2 %
MAGNESIUM SERPL-MCNC: 2.02 MG/DL (ref 1.8–2.4)
MCH RBC QN AUTO: 26.6 PG (ref 26–34)
MCHC RBC AUTO-ENTMCNC: 33.5 G/DL (ref 31–36)
MCV RBC AUTO: 79.4 FL (ref 80–100)
MONOCYTES # BLD: 0.5 K/UL (ref 0–1.3)
MONOCYTES NFR BLD: 6.6 %
MRSA DNA SPEC QL NAA+PROBE: NORMAL
NEUTROPHILS # BLD: 5.9 K/UL (ref 1.7–7.7)
NEUTROPHILS NFR BLD: 73.7 %
PERFORMED ON: ABNORMAL
PHOSPHATE SERPL-MCNC: 1.8 MG/DL (ref 2.5–4.9)
PLATELET # BLD AUTO: 245 K/UL (ref 135–450)
PMV BLD AUTO: 8.1 FL (ref 5–10.5)
POTASSIUM SERPL-SCNC: 2.9 MMOL/L (ref 3.5–5.1)
POTASSIUM SERPL-SCNC: 3.4 MMOL/L (ref 3.5–5.1)
RBC # BLD AUTO: 5.25 M/UL (ref 4.2–5.9)
S PNEUM AG UR QL: NORMAL
SODIUM SERPL-SCNC: 134 MMOL/L (ref 136–145)
WBC # BLD AUTO: 8 K/UL (ref 4–11)

## 2024-11-07 PROCEDURE — 6370000000 HC RX 637 (ALT 250 FOR IP): Performed by: HOSPITALIST

## 2024-11-07 PROCEDURE — 99233 SBSQ HOSP IP/OBS HIGH 50: CPT | Performed by: INTERNAL MEDICINE

## 2024-11-07 PROCEDURE — 84132 ASSAY OF SERUM POTASSIUM: CPT

## 2024-11-07 PROCEDURE — 6360000002 HC RX W HCPCS: Performed by: INTERNAL MEDICINE

## 2024-11-07 PROCEDURE — 83036 HEMOGLOBIN GLYCOSYLATED A1C: CPT

## 2024-11-07 PROCEDURE — 6370000000 HC RX 637 (ALT 250 FOR IP): Performed by: INTERNAL MEDICINE

## 2024-11-07 PROCEDURE — 83735 ASSAY OF MAGNESIUM: CPT

## 2024-11-07 PROCEDURE — 80069 RENAL FUNCTION PANEL: CPT

## 2024-11-07 PROCEDURE — 36415 COLL VENOUS BLD VENIPUNCTURE: CPT

## 2024-11-07 PROCEDURE — 1200000000 HC SEMI PRIVATE

## 2024-11-07 PROCEDURE — 2580000003 HC RX 258: Performed by: INTERNAL MEDICINE

## 2024-11-07 PROCEDURE — 85025 COMPLETE CBC W/AUTO DIFF WBC: CPT

## 2024-11-07 RX ADMIN — ENOXAPARIN SODIUM 30 MG: 100 INJECTION SUBCUTANEOUS at 09:05

## 2024-11-07 RX ADMIN — LINEZOLID 600 MG: 600 INJECTION, SOLUTION INTRAVENOUS at 05:13

## 2024-11-07 RX ADMIN — POTASSIUM BICARBONATE 40 MEQ: 782 TABLET, EFFERVESCENT ORAL at 06:30

## 2024-11-07 RX ADMIN — LEVOFLOXACIN 750 MG: 5 INJECTION, SOLUTION INTRAVENOUS at 17:03

## 2024-11-07 RX ADMIN — POTASSIUM BICARBONATE 40 MEQ: 782 TABLET, EFFERVESCENT ORAL at 09:05

## 2024-11-07 RX ADMIN — SODIUM CHLORIDE, PRESERVATIVE FREE 10 ML: 5 INJECTION INTRAVENOUS at 09:07

## 2024-11-07 RX ADMIN — POTASSIUM BICARBONATE 40 MEQ: 782 TABLET, EFFERVESCENT ORAL at 12:17

## 2024-11-07 RX ADMIN — ACETAMINOPHEN 650 MG: 325 TABLET ORAL at 09:05

## 2024-11-07 RX ADMIN — SODIUM CHLORIDE, PRESERVATIVE FREE 10 ML: 5 INJECTION INTRAVENOUS at 21:52

## 2024-11-07 ASSESSMENT — ENCOUNTER SYMPTOMS
EYE REDNESS: 0
DIARRHEA: 1
SORE THROAT: 0
RHINORRHEA: 0
NAUSEA: 0
BACK PAIN: 0
ABDOMINAL PAIN: 0
SHORTNESS OF BREATH: 0
EYE DISCHARGE: 0
SINUS PAIN: 0
WHEEZING: 0
SINUS PRESSURE: 0
COUGH: 0
CONSTIPATION: 0

## 2024-11-07 ASSESSMENT — PAIN SCALES - GENERAL: PAINLEVEL_OUTOF10: 0

## 2024-11-07 NOTE — CARE COORDINATION
Case Management Assessment  Initial Evaluation    Date/Time of Evaluation: 11/7/2024 11:57 AM  Assessment Completed by: ANN Cline    If patient is discharged prior to next notation, then this note serves as note for discharge by case management.    Patient Name: Julián Flannery                   YOB: 1995  Diagnosis: Hypokalemia [E87.6]  Hypomagnesemia [E83.42]  Septicemia (HCC) [A41.9]  Acute kidney injury (HCC) [N17.9]  Sepsis (HCC) [A41.9]  Pneumonia of both lungs due to infectious organism, unspecified part of lung [J18.9]                   Date / Time: 11/6/2024  6:25 AM    Patient Admission Status: Inpatient   Readmission Risk (Low < 19, Mod (19-27), High > 27): Readmission Risk Score: 6.5    Current PCP: Andree Haji MD  PCP verified by CM? (P) Yes    Chart Reviewed: Yes      History Provided by: (P) Patient  Patient Orientation: (P) Alert and Oriented    Patient Cognition: (P) Alert    Hospitalization in the last 30 days (Readmission):  No    If yes, Readmission Assessment in CM Navigator will be completed.    Advance Directives:      Code Status: Full Code   Patient's Primary Decision Maker is: (P) Legal Next of Kin      Discharge Planning:    Patient lives with: (P) Alone Type of Home: (P) Other (Comment) (Extended Stay Hotel)  Primary Care Giver: (P) Self  Patient Support Systems include: (P) Family Members   Current Financial resources: (P) None  Current community resources: (P) None  Current services prior to admission: (P) None            Current DME:              Type of Home Care services:  (P) None    ADLS  Prior functional level: (P) Independent in ADLs/IADLs  Current functional level: (P) Independent in ADLs/IADLs    PT AM-PAC:   /24  OT AM-PAC:   /24    Family can provide assistance at DC: (P) Yes  Would you like Case Management to discuss the discharge plan with any other family members/significant others, and if so, who? (P) No  Plans to Return to Present  Orientation Alert and Oriented   Cognition Alert   History Provided By Patient   Primary Caregiver Self   Accompanied By/Relationship NA   Support Systems Family Members   Patient's Healthcare Decision Maker is: Legal Next of Kin   PCP Verified by CM Yes   Last Visit to PCP Within last 3 months   Prior Functional Level Independent in ADLs/IADLs   Current Functional Level Independent in ADLs/IADLs   Can patient return to prior living arrangement Yes   Ability to make needs known: Good   Family able to assist with home care needs: Yes   Would you like for me to discuss the discharge plan with any other family members/significant others, and if so, who? No   Financial Resources None   Community Resources None   CM/SW Referral Other (see comment)  (NA)   Discharge Planning   Type of Residence Other (Comment)  (Extended Stay Hot)   Living Arrangements Alone   Current Services Prior To Admission None   Potential Assistance Needed N/A   DME Ordered? No   Potential Assistance Purchasing Medications No   Type of Home Care Services None   Patient expects to be discharged to: Other (comment)  (Extended Stay)   One/Two Story Residence One story   History of falls? 0   Services At/After Discharge   Transition of Care Consult (CM Consult) N/A   Services At/After Discharge None

## 2024-11-07 NOTE — PROGRESS NOTES
[facility]   Nephrology progress Note  [phone]  [phone]   [employer]         Reason for Consult:  IAM    HISTORY OF PRESENT ILLNESS:      The patient is a 28 y.o. male with significant past medical history of hypertension, diabetes, depression, HIV who presents with c/o body aches. Noted to have sepsis 2/2 pneumonia. Received IVF bolus on admission. Nephrology is consulted for IAM.   Lactic acidosis resolved post IVF.   Is hypokalemic.         Past Medical History:        Diagnosis Date    Depression     Diabetes mellitus (HCC)     on Trulicity    HIV (human immunodeficiency virus infection) (HCC)     Hypertension     MRSA (methicillin resistant staph aureus) culture positive 06/06/2018    leg       Past Surgical History:        Procedure Laterality Date    WISDOM TOOTH EXTRACTION         Current Medications:    No current facility-administered medications on file prior to encounter.     Current Outpatient Medications on File Prior to Encounter   Medication Sig Dispense Refill    losartan (COZAAR) 25 MG tablet Take 1 tablet by mouth daily      Melatonin 10 MG TBDP Take 10 mg by mouth nightly as needed (sleep)      dulaglutide (TRULICITY) 1.5 MG/0.5ML SC injection Inject 0.5 mLs into the skin once a week On Sundays      CHLORTHALIDONE PO Take 25 mg by mouth daily      metFORMIN (GLUCOPHAGE-XR) 500 MG extended release tablet Take 1 tablet by mouth daily (with breakfast)      amLODIPine (NORVASC) 10 MG tablet Take 1 tablet by mouth      emtricitabine-rilpivirine-tenofovir alafenamide (ODEFSEY) 200-25-25 MG TABS per tablet Take 1 tablet by mouth daily         Allergies:  Bactrim [sulfamethoxazole-trimethoprim]    Social History:    Social History     Socioeconomic History    Marital status: Single     Spouse name: Not on file    Number of children: 0    Years of education: 12    Highest education level: Not on file   Occupational History    Occupation: behavorial health specialist  questions or concerns.       Pino Jean-Baptiste DO  11/7/2024

## 2024-11-07 NOTE — PROGRESS NOTES
HOSPITALISTS PROGRESS NOTE    11/7/2024 8:11 AM        Name: Julián Flannery .              Admitted: 11/6/2024  Primary Care Provider: Andree Haji MD (Tel: [phone])      Brief Course: This 28 y.o. male  with PMHx of HIV, diabetes mellitus, hypertension and depression who presented with generalized bodyaches and pain.     Interval history:   Pt seen and examined today   Overnight events noted and interval ancillary notes reviewed.  On RA satting well.  Febrile with Tmax 101.3 this a.m.  WBCs trended down.  Respiratory molecular panel negative.    Low potassium this morning; replacement.  Check potassium level after replacement  Pt reported that his body aches and pain has improved but continues to have watery diarrhea had about 6-8 loose stools yesterday and 2 this morning.  Denied any nausea, vomiting, abdominal pain or urinary symptoms        Assessment & Plan:       Sepsis; likely 2/2 Shigella  P/w high-grade fever, tachycardia, tachypnea, leukocytosis and lactic acidosis  CXR s/o atypical pneumonia.  Strep pneumo, Legionella Ag& respiratory molecular panel and MRSA nare negative  Blood cultures NGTD.  Stool for C. difficile negative.  GI PCR came back positive for Shigella  ID on board; on Zyvox and Levaquin per their recs.     Lactic acidosis; likely 2/2 sepsis/dehydration.  Resolved.  IV fluid DC'd     IAM: Likely prerenal in setting of diarrhea/sepsis  Creatinine 1.4 on admission; baseline around  Nephrology consulted; continue IV fluids.  Avoid nephrotoxin, strict I&O.  Monitor renal function closely     Diarrhea; GI PCR positive for Shigella.  Continue Protonix per ID recs     Hypertension; holding BP meds due to soft pressure.  Monitor BP for now will add medication if needed     Hypokalemia and hypomagnesemia: Replacement ordered.  Check electrolyte  levels after replacement     Diabetes mellitus; check HgbA1c.  Continue  tenderness, not distended, normal bowel sounds  Musculoskeletal: No cyanosis in digits.  No BLE edema present  Neurology: CN 2-12 grossly intact. No speech or motor deficits  Psych: Not agitated, appropriate affect  Skin: Warm, dry, normal turgor    Labs and Tests:  CBC:   Recent Labs     11/06/24  0653 11/07/24 0457   WBC 13.8* 8.0   HGB 14.3 14.0    245     BMP:    Recent Labs     11/06/24 0653 11/07/24 0457    134*   K 3.1* 2.9*   CL 98* 98*   CO2 24 25   BUN 11 8   CREATININE 1.4* 1.0   GLUCOSE 161* 115*     Hepatic:   Recent Labs     11/06/24 0653   AST 32   ALT 29   BILITOT 0.6   ALKPHOS 67     XR CHEST PORTABLE   Final Result   Increasing perihilar opacities centrally. This is suspected to represent mild   vascular congestion.  An atypical viral infection may be considered.             Problem List  Principal Problem:    Sepsis (HCC)  Active Problems:    Class 2 obesity due to excess calories with body mass index (BMI) of 35.0 to 35.9 in adult    Type 2 diabetes mellitus with other specified complication (HCC)    Essential hypertension    History of allergy to sulfonamides    History of syphilis    HIV positive (HCC)    History of MRSA infection    Atypical pneumonia    Acute kidney injury (HCC)    Hypokalemia    Hypomagnesemia    Pneumonia of both lungs due to infectious organism  Resolved Problems:    * No resolved hospital problems. *       SHERI DIAZ MD   11/7/2024 8:11 AM      Please note that some part of this chart was generated using Dragon dictation software. Although every effort was made to ensure the accuracy of this automated transcription, some errors in transcription may have occurred inadvertently. If you may need any clarification, please do not hesitate to contact me through EPIC.

## 2024-11-07 NOTE — PROGRESS NOTES
Infectious Diseases   Progress Note      Admission Date: 11/6/2024  Hospital Day: Hospital Day: 2   Attending: Sheri Diaz MD  Date of service: 11/7/2024     Chief complaint/ Reason for consult:     Sepsis with high-grade fever, leukocytosis, tachycardia, tachypnea  Atypical pneumonia  Shigella diarrhea  History of MRSA  HIV-positive, well-controlled with antiretroviral therapy  Acute kidney injury    Microbiology:      I have reviewed allavailable micro lab data and cultures    Results       Procedure Component Value Units Date/Time    GI Bacterial Pathogens By PCR [[id number]]  (Abnormal) Collected: 11/06/24 1632    Order Status: Completed Specimen: Stool Updated: 11/07/24 1232     GI Bacterial Pathogens By PCR --     No Shiga toxin-producing gene(s) detected  No Campylobacter spp. (jejuni and coli)DNA detected  No Salmonella spp. DNA detected  No Vibrio vulnificus/parahaemolyticus/cholerae DNA detected  No Plesiomonas DNA detected  No Yersinia enterocolitica DNA detected  No Enterotoxigenic E. coli (ETEC) detected  Normal Range:  None detected       Organism Shigella spp/EIEC DNA     GI Bacterial Pathogens By PCR POSITIVE for    Narrative:      ORDER#: [id number]                          ORDERED BY: SHERI DIAZ  SOURCE: Stool                              COLLECTED:  11/06/24 16:32  ANTIBIOTICS AT JOSE ANGEL.:                      RECEIVED :  11/07/24 02:15  CALL  [facility] tel. [phone],  Microbiology results called to and read back by Ivan Mcmanus RN, 11/07/2024  12:32, by GERALDO    Clostridium difficile toxin/antigen [[id number]] Collected: 11/06/24 1545    Order Status: Completed Specimen: Stool Updated: 11/06/24 1723     C.diff Toxin/Antigen --     Negative for Clostridium difficile antigen and toxin  Normal Range: Negative      Narrative:      ORDER#: [id number]                          ORDERED BY: SHERI DIAZ  SOURCE: Stool                              COLLECTED:  11/06/24 15:45  ANTIBIOTICS AT  IntraVENous Q24H        sodium chloride      dextrose         sodium chloride flush, sodium chloride, ondansetron **OR** ondansetron, acetaminophen **OR** acetaminophen, dextrose bolus **OR** dextrose bolus, glucagon (rDNA), dextrose      Problem list:       Patient Active Problem List   Diagnosis Code    Sepsis (HCC) A41.9    Class 2 obesity due to excess calories with body mass index (BMI) of 35.0 to 35.9 in adult E66.812, E66.09, Z68.35    Type 2 diabetes mellitus with other specified complication (HCC) E11.69    Essential hypertension I10    History of allergy to sulfonamides Z88.2    History of syphilis Z86.19    HIV positive (HCC) Z21    History of MRSA infection Z86.14    Atypical pneumonia J18.9    Acute kidney injury (HCC) N17.9    Hypokalemia E87.6    Hypomagnesemia E83.42    Pneumonia of both lungs due to infectious organism J18.9       Please note that this chart was generated using Dragon dictation software. Although every effort was made to ensure the accuracy of this automated transcription, some errors in transcription may have occurred inadvertently. If you may need any clarification, please do not hesitate to contact me through EPIC or at the phone number provided below with my electronic signature.  Any pictures or media included in this note were obtained after taking informed verbal consent from the patient and with their approval to include those in the patient's medical record.        Hector Isaac MD, MPH, FACP, [facility]  11/7/2024, 2:59 PM  Central Office Phone: [phone]  Central Office Fax: [phone]    [facility] Infectious Disease   [address], [address] ([address])  [place] [address]  [facility] days:  Tuesday & Thursday AM    [facility] Infectious Disease  [address] , [address] (Medical office Building)  [place], [place], [address]  [facility] days: Wednesday AM

## 2024-11-08 VITALS
HEART RATE: 86 BPM | HEIGHT: 73 IN | DIASTOLIC BLOOD PRESSURE: 86 MMHG | SYSTOLIC BLOOD PRESSURE: 125 MMHG | OXYGEN SATURATION: 99 % | RESPIRATION RATE: 18 BRPM | WEIGHT: 262.8 LBS | TEMPERATURE: 98 F | BODY MASS INDEX: 34.83 KG/M2

## 2024-11-08 PROBLEM — Z71.89 ENCOUNTER FOR MEDICATION COUNSELING: Status: ACTIVE | Noted: 2024-11-08

## 2024-11-08 PROBLEM — Z71.7 ENCOUNTER FOR HIV COUNSELING: Status: ACTIVE | Noted: 2024-11-08

## 2024-11-08 LAB
ALBUMIN SERPL-MCNC: 4.2 G/DL (ref 3.4–5)
ANION GAP SERPL CALCULATED.3IONS-SCNC: 9 MMOL/L (ref 3–16)
BASOPHILS # BLD: 0 K/UL (ref 0–0.2)
BASOPHILS NFR BLD: 0.5 %
BUN SERPL-MCNC: 11 MG/DL (ref 7–20)
CALCIUM SERPL-MCNC: 8.7 MG/DL (ref 8.3–10.6)
CHLORIDE SERPL-SCNC: 103 MMOL/L (ref 99–110)
CO2 SERPL-SCNC: 28 MMOL/L (ref 21–32)
CREAT SERPL-MCNC: 1 MG/DL (ref 0.9–1.3)
DEPRECATED RDW RBC AUTO: 14 % (ref 12.4–15.4)
EOSINOPHIL # BLD: 0.1 K/UL (ref 0–0.6)
EOSINOPHIL NFR BLD: 0.8 %
FERRITIN SERPL IA-MCNC: 336 NG/ML (ref 30–400)
GFR SERPLBLD CREATININE-BSD FMLA CKD-EPI: >90 ML/MIN/{1.73_M2}
GLUCOSE SERPL-MCNC: 164 MG/DL (ref 70–99)
HCT VFR BLD AUTO: 39.4 % (ref 40.5–52.5)
HGB BLD-MCNC: 13.5 G/DL (ref 13.5–17.5)
IRON SATN MFR SERPL: 14 % (ref 20–50)
IRON SERPL-MCNC: 33 UG/DL (ref 59–158)
LYMPHOCYTES # BLD: 2.3 K/UL (ref 1–5.1)
LYMPHOCYTES NFR BLD: 35.5 %
MCH RBC QN AUTO: 26.8 PG (ref 26–34)
MCHC RBC AUTO-ENTMCNC: 34.3 G/DL (ref 31–36)
MCV RBC AUTO: 78.3 FL (ref 80–100)
MONOCYTES # BLD: 0.6 K/UL (ref 0–1.3)
MONOCYTES NFR BLD: 9.9 %
NEUTROPHILS # BLD: 3.4 K/UL (ref 1.7–7.7)
NEUTROPHILS NFR BLD: 53.3 %
PHOSPHATE SERPL-MCNC: 1.8 MG/DL (ref 2.5–4.9)
PLATELET # BLD AUTO: 247 K/UL (ref 135–450)
PMV BLD AUTO: 7.5 FL (ref 5–10.5)
POTASSIUM SERPL-SCNC: 3.3 MMOL/L (ref 3.5–5.1)
RBC # BLD AUTO: 5.03 M/UL (ref 4.2–5.9)
SODIUM SERPL-SCNC: 140 MMOL/L (ref 136–145)
TIBC SERPL-MCNC: 234 UG/DL (ref 260–445)
WBC # BLD AUTO: 6.4 K/UL (ref 4–11)

## 2024-11-08 PROCEDURE — 83550 IRON BINDING TEST: CPT

## 2024-11-08 PROCEDURE — 6370000000 HC RX 637 (ALT 250 FOR IP): Performed by: HOSPITALIST

## 2024-11-08 PROCEDURE — 36415 COLL VENOUS BLD VENIPUNCTURE: CPT

## 2024-11-08 PROCEDURE — 85025 COMPLETE CBC W/AUTO DIFF WBC: CPT

## 2024-11-08 PROCEDURE — 83540 ASSAY OF IRON: CPT

## 2024-11-08 PROCEDURE — 2580000003 HC RX 258: Performed by: INTERNAL MEDICINE

## 2024-11-08 PROCEDURE — 80069 RENAL FUNCTION PANEL: CPT

## 2024-11-08 PROCEDURE — 82728 ASSAY OF FERRITIN: CPT

## 2024-11-08 PROCEDURE — 99233 SBSQ HOSP IP/OBS HIGH 50: CPT | Performed by: INTERNAL MEDICINE

## 2024-11-08 RX ORDER — LEVOFLOXACIN 750 MG/1
750 TABLET, FILM COATED ORAL DAILY
Qty: 7 TABLET | Refills: 0 | Status: SHIPPED | OUTPATIENT
Start: 2024-11-08 | End: 2024-11-15

## 2024-11-08 RX ADMIN — POTASSIUM BICARBONATE 40 MEQ: 782 TABLET, EFFERVESCENT ORAL at 11:11

## 2024-11-08 RX ADMIN — SODIUM CHLORIDE, PRESERVATIVE FREE 10 ML: 5 INJECTION INTRAVENOUS at 09:13

## 2024-11-08 RX ADMIN — POTASSIUM BICARBONATE 40 MEQ: 782 TABLET, EFFERVESCENT ORAL at 09:13

## 2024-11-08 ASSESSMENT — ENCOUNTER SYMPTOMS
ABDOMINAL PAIN: 0
DIARRHEA: 1
EYE DISCHARGE: 0
SORE THROAT: 0
COUGH: 0
SINUS PAIN: 0
BACK PAIN: 0
NAUSEA: 0
RHINORRHEA: 0
SHORTNESS OF BREATH: 0
SINUS PRESSURE: 0
EYE REDNESS: 0
CONSTIPATION: 0
WHEEZING: 0

## 2024-11-08 NOTE — DISCHARGE INSTR - COC
Continuity of Care Form    Patient Name: Julián Flannery   :  1995  MRN:  [id number]    Admit date:  2024  Discharge date:  ***    Code Status Order: Full Code   Advance Directives:   Advance Care Flowsheet Documentation             Admitting Physician:  Caprice Obrien MD  PCP: Andree Haji MD    Discharging Nurse: ***  Discharging Hospital Unit/Room#: 3AN-3319/3319-01  Discharging Unit Phone Number: ***    Emergency Contact:   Extended Emergency Contact Information  Primary Emergency Contact: zachary pradhan  Home Phone: [phone]  Relation: Other   needed? No  Secondary Emergency Contact: Sara Pittman  [email or web address] Phone: [phone]  Relation: None    Past Surgical History:  Past Surgical History:   Procedure Laterality Date    WISDOM TOOTH EXTRACTION         Immunization History:   Immunization History   Administered Date(s) Administered    COVID-19, PFIZER GRAY top, DO NOT Dilute, (age 12 y+), IM, 30 mcg/0.3 mL 2022    COVID-19, PFIZER PURPLE top, DILUTE for use, (age 12 y+), 30mcg/0.3mL 2022    Td, unspecified formulation 2015       Active Problems:  Patient Active Problem List   Diagnosis Code    Septicemia ([facility]) A41.9    Class 2 obesity due to excess calories with body mass index (BMI) of 35.0 to 35.9 in adult E66.812, E66.09, Z68.35    Type 2 diabetes mellitus with other specified complication ([facility]) E11.69    Essential hypertension I10    History of allergy to sulfonamides Z88.2    History of syphilis Z86.19    HIV positive (HCC) Z21    History of MRSA infection Z86.14    Atypical pneumonia J18.9    Acute kidney injury (HCC) N17.9    Hypokalemia E87.6    Hypomagnesemia E83.42    Pneumonia of both lungs due to infectious organism J18.9    Shigella enteritis A03.9       Isolation/Infection:   Isolation            Contact          Patient Infection Status       Infection Onset Added Last Indicated Last Indicated By Review Planned Expiration Resolved Resolved By

## 2024-11-08 NOTE — PROGRESS NOTES
Discharge instructions given to patient. Patient states understanding. IV removed and dressing applied. Home meds returned with patient. Pt transported to pharmacy and received prescription. Belongings gathered and sent with patient. Patient transported out by this RN and d/c to home.

## 2024-11-08 NOTE — PROGRESS NOTES
[facility]   Nephrology progress Note  [phone]  [phone]   [employer] Resistant Bedding         Reason for Consult:  IAM    HISTORY OF PRESENT ILLNESS:      The patient is a 28 y.o. male with significant past medical history of hypertension, diabetes, depression, HIV who presents with c/o body aches. Noted to have sepsis 2/2 pneumonia. Received IVF bolus on admission. Nephrology is consulted for IAM.   Lactic acidosis resolved post IVF.   Is hypokalemic.     Interval history  Feels better compared to yesterday  Off IVF.   Tolerating po intake.   Asking to go home.     Scheduled Meds:   sodium phosphate 19.08 mmol in sodium chloride 0.9 % 250 mL IVPB  0.16 mmol/kg IntraVENous Once    sodium chloride flush  5-40 mL IntraVENous 2 times per day    enoxaparin  30 mg SubCUTAneous BID    insulin lispro  0-4 Units SubCUTAneous 4x Daily AC & HS    emtricitabine-rilpivirine-tenofovir alafenamide  1 tablet Oral Daily    levofloxacin  750 mg IntraVENous Q24H     Continuous Infusions:   sodium chloride      dextrose       PRN Meds:.sodium chloride flush, sodium chloride, ondansetron **OR** ondansetron, acetaminophen **OR** acetaminophen, dextrose bolus **OR** dextrose bolus, glucagon (rDNA), dextrose         PHYSICAL EXAM:    Vitals:    /86   Pulse 86   Temp 98 °F (36.7 °C) (Oral)   Resp 18   Ht 1.854 m (6' 1\")   Wt 119.2 kg (262 lb 12.8 oz)   SpO2 99%   BMI 34.67 kg/m²     Intake/Output Summary (Last 24 hours) at 11/8/2024 1218  Last data filed at 11/7/2024 2145  Gross per 24 hour   Intake 240 ml   Output --   Net 240 ml       Physical Exam:  CONSTITUTIONAL/PSYCHIATRY: awake, alert and oriented x3. Not in acute distress   EYES: Conjunctivae: normal. Pupils: reactive to light  RESPIRATORY: Respiratory effort: normal. Auscultation: clear  CARDIOVASCULAR: Auscultation: regular. tachycardia. No JVD, Edema: trace.   GASTROINTESTINAL: Soft, nontender, nondistended. Liver: normal in size  EXTREMITIES:

## 2024-11-08 NOTE — PROGRESS NOTES
Infectious Diseases   Progress Note      Admission Date: 11/6/2024  Hospital Day: Hospital Day: 3   Attending: Sheri Diaz MD  Date of service: 11/8/2024     Chief complaint/ Reason for consult:     Sepsis with high-grade fever, leukocytosis, tachycardia, tachypnea  Atypical pneumonia  Shigella diarrhea  History of MRSA  HIV-positive, well-controlled with antiretroviral therapy  Acute kidney injury    Microbiology:      I have reviewed allavailable micro lab data and cultures    Results       Procedure Component Value Units Date/Time    GI Bacterial Pathogens By PCR [[id number]]  (Abnormal) Collected: 11/06/24 1632    Order Status: Completed Specimen: Stool Updated: 11/07/24 1232     GI Bacterial Pathogens By PCR --     No Shiga toxin-producing gene(s) detected  No Campylobacter spp. (jejuni and coli)DNA detected  No Salmonella spp. DNA detected  No Vibrio vulnificus/parahaemolyticus/cholerae DNA detected  No Plesiomonas DNA detected  No Yersinia enterocolitica DNA detected  No Enterotoxigenic E. coli (ETEC) detected  Normal Range:  None detected       Organism Shigella spp/EIEC DNA     GI Bacterial Pathogens By PCR POSITIVE for    Narrative:      ORDER#: [id number]                          ORDERED BY: SHERI DIAZ  SOURCE: Stool                              COLLECTED:  11/06/24 16:32  ANTIBIOTICS AT [facility].:                      RECEIVED :  11/07/24 02:15  CALL  [facility]  [facility] tel. [phone],  Microbiology results called to and read back by Ivan Mcmanus RN, 11/07/2024  12:32, by GERALDO    Clostridium difficile toxin/antigen [[id number]] Collected: 11/06/24 1545    Order Status: Completed Specimen: Stool Updated: 11/06/24 1723     C.diff Toxin/Antigen --     Negative for Clostridium difficile antigen and toxin  Normal Range: Negative      Narrative:      ORDER#: [id number]                          ORDERED BY: SHERI DIAZ  SOURCE: Stool                              COLLECTED:  11/06/24 15:45  ANTIBIOTICS AT    [facility] 32.4 33.5 34.3   RDW 14.1 14.4 14.0        BMP:  Recent Labs     11/06/24  0653 11/07/24  0457 11/07/24  1843 11/08/24  0453    134*  --  140   K 3.1* 2.9* 3.4* 3.3*   CL 98* 98*  --  103   CO2 24 25  --  28   BUN 11 8  --  11   CREATININE 1.4* 1.0  --  1.0   CALCIUM 9.5 8.6  --  8.7   GLUCOSE 161* 115*  --  164*        Hepatic Function Panel:   Lab Results   Component Value Date/Time    ALKPHOS 67 11/06/2024 06:53 AM    ALT 29 11/06/2024 06:53 AM    AST 32 11/06/2024 06:53 AM    BILITOT 0.6 11/06/2024 06:53 AM       CPK:   Lab Results   Component Value Date    CKTOTAL 525 (H) 11/06/2024     ESR: No results found for: \"SEDRATE\"  CRP: No results found for: \"CRP\"        Imaging:    All pertinent images and reports for the current visit were reviewed by me during this visit.  I reviewed the chest x-ray/CT scan/MRI images today and independently interpreted the findings and results today.    XR CHEST PORTABLE   Final Result   Increasing perihilar opacities centrally. This is suspected to represent mild   vascular congestion.  An atypical viral infection may be considered.             Medications: All current and past medications were reviewed.     sodium phosphate 19.08 mmol in sodium chloride 0.9 % 250 mL IVPB  0.16 mmol/kg IntraVENous Once    sodium chloride flush  5-40 mL IntraVENous 2 times per day    enoxaparin  30 mg SubCUTAneous BID    insulin lispro  0-4 Units SubCUTAneous 4x Daily AC & HS    emtricitabine-rilpivirine-tenofovir alafenamide  1 tablet Oral Daily    levofloxacin  750 mg IntraVENous Q24H        sodium chloride      dextrose         sodium chloride flush, sodium chloride, ondansetron **OR** ondansetron, acetaminophen **OR** acetaminophen, dextrose bolus **OR** dextrose bolus, glucagon (rDNA), dextrose      Problem list:       Patient Active Problem List   Diagnosis Code    Septicemia (HCC) A41.9    Class 2 obesity due to excess calories with body mass index (BMI) of 35.0 to 35.9 in adult

## 2024-11-08 NOTE — CARE COORDINATION
Case Management -  Discharge Note      Patient Name: Julián Flannery                   YOB: 1995            Readmission Risk (Low < 19, Mod (19-27), High > 27): Readmission Risk Score: 6.5    Current PCP: Andree Haji MD      (IMM) Important Message from Medicare:    Has pt received appropriate IMM before discharge if required: N/A  Date: NA    PT AM-PAC:   /24  OT AM-PAC:   /24    Patient/patient representative has been educated on the benefits of N/A as well as the possible risks of declining recommended services. Patient/patient representative has acknowledged the information provided and decided on the following discharge plan. Patient/ patient representative has been provided freedom of choice regarding service provider, supported by basic dialogue that supports the patient's individualized plan of care/goals.      Financial    Payor: UMR / Plan: UMR / Product Type: *No Product type* /     Pharmacy:  Potential assistance Purchasing Medications: No  Meds-to-Beds request: Yes      [facility]/pharmacy #[id number] - [place], [place] - [address] - P [phone] - F [phone]  [address]  [facility] [address]  Phone: [phone] Fax: [phone]      Notes:    Additional Case Management Notes: Patient will discharge home. No needs.     Electronically signed by ANN Cline on 11/8/24 at 12:41 PM EST

## 2024-11-08 NOTE — DISCHARGE SUMMARY
Hospital Medicine Discharge Summary    Patient ID: Julián Flannery      Patient's PCP: Andree Haji MD    Admit Date: 11/6/2024     Discharge Date: 11/8/2024     Admitting Physician: Sheri Diaz MD     Discharge Physician: SHERI DIAZ MD     Hospital Course:  This 28 y.o. male  with PMHx of HIV, diabetes mellitus, hypertension and depression who presented with generalized bodyaches and pain.       She Nesha diarrhea; GI PCR positive for Shigella.  Calculated Levaquin per ID recs    Sepsis; likely 2/2 Shigella Shigella diarrhea.  Improved     Lactic acidosis; likely 2/2 sepsis/dehydration.  Resolved.       IAM: Likely prerenal in setting of diarrhea/sepsis; resolved  Cr: 1.4 on admission; trending down.  Nephrology input appreciated.  Losartan and chlorthalidone held per their recs      Hypertension: Continue amlodipine.  Losartan and chlorthalidone held due to IAM and soft BP     Hypokalemia and hypomagnesemi; resolvedA 7     Diabetes mellitus; continue current regimen monitor BG closely     Hx of HIV; controlled.  Continue current regimen     Hx of syphilis; s/p treatment.     Morbid obesity with BMI of 35.95.  Counseled on weight loss and healthy lifestyle modifications    Physical Exam Performed:     /86   Pulse 86   Temp 98 °F (36.7 °C) (Oral)   Resp 18   Ht 1.854 m (6' 1\")   Wt 119.2 kg (262 lb 12.8 oz)   SpO2 99%   BMI 34.67 kg/m²     General appearance: No apparent distress, appears stated age and cooperative.  Cardiovascular: Regular rhythm, normal S1, S2. No murmur.   Respiratory: Clear to auscultation bilaterally, no wheeze or crackles.   GI: Abdomen soft, no tenderness, not distended, normal bowel sounds  Musculoskeletal:  No cyanosis in digits.  No BLE edema present  Neurology: CN 2-12 grossly intact. No speech or motor deficits      Consults:     IP CONSULT TO INFECTIOUS DISEASES  IP CONSULT TO NEPHROLOGY    Disposition: Home    Condition at Discharge: Stable

## 2024-11-08 NOTE — PLAN OF CARE
Problem: Chronic Conditions and Co-morbidities  Goal: Patient's chronic conditions and co-morbidity symptoms are monitored and maintained or improved  Outcome: Progressing     Problem: Discharge Planning  Goal: Discharge to home or other facility with appropriate resources  Outcome: Progressing     Problem: Pain  Goal: Verbalizes/displays adequate comfort level or baseline comfort level  Outcome: Progressing  Flowsheets (Taken 11/7/2024 8143 by Henry Mcmanus RN)  Verbalizes/displays adequate comfort level or baseline comfort level: Assess pain using appropriate pain scale     Problem: Gastrointestinal - Adult  Goal: Minimal or absence of nausea and vomiting  Outcome: Progressing  Goal: Maintains or returns to baseline bowel function  Outcome: Progressing  Goal: Maintains adequate nutritional intake  Outcome: Progressing     Problem: Metabolic/Fluid and Electrolytes - Adult  Goal: Electrolytes maintained within normal limits  Outcome: Progressing

## 2024-11-08 NOTE — PROGRESS NOTES
CLINICAL PHARMACY NOTE: MEDS TO BEDS    Total # of Prescriptions Filled: 1   The following medications were delivered to the patient:  Levofloxacin 750mg    Additional Documentation:     Medication was picked up in the Outpatient Pharmacy by patient    Akiko Ness CPhT

## 2024-11-08 NOTE — DISCHARGE INSTRUCTIONS
Follow up with your PCP within 3-4 days of discharge.  Have PCP check blood pressure and adjust BP medication dose if needed  Follow up with infectious disease as instructed   Follow up with nephrology as instructed   Take all your medications as prescribed.

## 2024-11-08 NOTE — PLAN OF CARE
11/8/2024    To whom it may concern:    Julián Flannery  was hospitalized from 11/6/2024 to 11/8/2024.  Pt  is cleared to return to work on Monday, November 11, 2024.    If you have any questions or concerns, please do not hesitate to contact me at [phone].    Sincerely,          Electronically signed by SHERI DIAZ MD on 11/8/2024 at 12:39 PM

## 2024-11-08 NOTE — PROGRESS NOTES
Following message sent to Dr. Obrien via perfect serve:    Patient is requesting to leave. He's agreed to stay until noon. Can we discharge him? Thanks

## 2024-11-10 LAB
BACTERIA BLD CULT ORG #2: NORMAL
BACTERIA BLD CULT: NORMAL
GI PATHOGENS PNL STL NAA+PROBE: ABNORMAL
ORGANISM: ABNORMAL
ORGANISM: ABNORMAL

## 2024-11-11 LAB
GI PATHOGENS PNL STL NAA+PROBE: ABNORMAL
ORGANISM: ABNORMAL
ORGANISM: ABNORMAL

## 2025-06-03 ENCOUNTER — HOSPITAL ENCOUNTER (EMERGENCY)
Age: 30
Discharge: HOME | End: 2025-06-03
Attending: STUDENT IN AN ORGANIZED HEALTH CARE EDUCATION/TRAINING PROGRAM
Payer: COMMERCIAL

## 2025-06-03 ENCOUNTER — APPOINTMENT (OUTPATIENT)
Dept: GENERAL RADIOLOGY | Age: 30
End: 2025-06-03
Payer: COMMERCIAL

## 2025-06-03 VITALS
TEMPERATURE: 97.9 F | HEIGHT: 73 IN | WEIGHT: 297 LBS | DIASTOLIC BLOOD PRESSURE: 89 MMHG | HEART RATE: 82 BPM | OXYGEN SATURATION: 100 % | RESPIRATION RATE: 18 BRPM | SYSTOLIC BLOOD PRESSURE: 171 MMHG | BODY MASS INDEX: 39.36 KG/M2

## 2025-06-03 DIAGNOSIS — M79.601 PAIN OF RIGHT UPPER EXTREMITY: Primary | ICD-10-CM

## 2025-06-03 LAB
ANION GAP SERPL CALCULATED.3IONS-SCNC: 10 MMOL/L (ref 3–16)
BASOPHILS # BLD: 0 K/UL (ref 0–0.2)
BASOPHILS NFR BLD: 0.6 %
BUN SERPL-MCNC: 13 MG/DL (ref 7–20)
CALCIUM SERPL-MCNC: 9.4 MG/DL (ref 8.3–10.6)
CHLORIDE SERPL-SCNC: 104 MMOL/L (ref 99–110)
CO2 SERPL-SCNC: 25 MMOL/L (ref 21–32)
CREAT SERPL-MCNC: 1 MG/DL (ref 0.9–1.3)
DEPRECATED RDW RBC AUTO: 14 % (ref 12.4–15.4)
EKG ATRIAL RATE: 76 BPM
EKG DIAGNOSIS: NORMAL
EKG P AXIS: 46 DEGREES
EKG P-R INTERVAL: 142 MS
EKG Q-T INTERVAL: 338 MS
EKG QRS DURATION: 80 MS
EKG QTC CALCULATION (BAZETT): 380 MS
EKG R AXIS: 62 DEGREES
EKG T AXIS: 2 DEGREES
EKG VENTRICULAR RATE: 76 BPM
EOSINOPHIL # BLD: 0.2 K/UL (ref 0–0.6)
EOSINOPHIL NFR BLD: 2.5 %
FLUAV RNA RESP QL NAA+PROBE: NOT DETECTED
FLUBV RNA RESP QL NAA+PROBE: NOT DETECTED
GFR SERPLBLD CREATININE-BSD FMLA CKD-EPI: >90 ML/MIN/{1.73_M2}
GLUCOSE SERPL-MCNC: 129 MG/DL (ref 70–99)
HCT VFR BLD AUTO: 39.7 % (ref 40.5–52.5)
HGB BLD-MCNC: 13.6 G/DL (ref 13.5–17.5)
LYMPHOCYTES # BLD: 2.7 K/UL (ref 1–5.1)
LYMPHOCYTES NFR BLD: 40.9 %
MCH RBC QN AUTO: 26.7 PG (ref 26–34)
MCHC RBC AUTO-ENTMCNC: 34.3 G/DL (ref 31–36)
MCV RBC AUTO: 77.8 FL (ref 80–100)
MONOCYTES # BLD: 0.6 K/UL (ref 0–1.3)
MONOCYTES NFR BLD: 9 %
NEUTROPHILS # BLD: 3.1 K/UL (ref 1.7–7.7)
NEUTROPHILS NFR BLD: 47 %
NT-PROBNP SERPL-MCNC: 37 PG/ML (ref 0–124)
PLATELET # BLD AUTO: 297 K/UL (ref 135–450)
PMV BLD AUTO: 7.9 FL (ref 5–10.5)
POTASSIUM SERPL-SCNC: 3.9 MMOL/L (ref 3.5–5.1)
RBC # BLD AUTO: 5.1 M/UL (ref 4.2–5.9)
SARS-COV-2 RNA RESP QL NAA+PROBE: NOT DETECTED
SODIUM SERPL-SCNC: 139 MMOL/L (ref 136–145)
TROPONIN, HIGH SENSITIVITY: 9 NG/L (ref 0–22)
WBC # BLD AUTO: 6.6 K/UL (ref 4–11)

## 2025-06-03 PROCEDURE — 80048 BASIC METABOLIC PNL TOTAL CA: CPT

## 2025-06-03 PROCEDURE — 84484 ASSAY OF TROPONIN QUANT: CPT

## 2025-06-03 PROCEDURE — 83880 ASSAY OF NATRIURETIC PEPTIDE: CPT

## 2025-06-03 PROCEDURE — 71046 X-RAY EXAM CHEST 2 VIEWS: CPT

## 2025-06-03 PROCEDURE — 99285 EMERGENCY DEPT VISIT HI MDM: CPT

## 2025-06-03 PROCEDURE — 93005 ELECTROCARDIOGRAM TRACING: CPT | Performed by: STUDENT IN AN ORGANIZED HEALTH CARE EDUCATION/TRAINING PROGRAM

## 2025-06-03 PROCEDURE — 87636 SARSCOV2 & INF A&B AMP PRB: CPT

## 2025-06-03 PROCEDURE — 6370000000 HC RX 637 (ALT 250 FOR IP): Performed by: STUDENT IN AN ORGANIZED HEALTH CARE EDUCATION/TRAINING PROGRAM

## 2025-06-03 PROCEDURE — 85025 COMPLETE CBC W/AUTO DIFF WBC: CPT

## 2025-06-03 RX ADMIN — IBUPROFEN 600 MG: 200 TABLET, FILM COATED ORAL at 04:30

## 2025-06-03 ASSESSMENT — PAIN SCALES - GENERAL: PAINLEVEL_OUTOF10: 4

## 2025-06-03 ASSESSMENT — PAIN DESCRIPTION - PAIN TYPE: TYPE: ACUTE PAIN

## 2025-06-03 ASSESSMENT — PAIN - FUNCTIONAL ASSESSMENT
PAIN_FUNCTIONAL_ASSESSMENT: 0-10
PAIN_FUNCTIONAL_ASSESSMENT: ACTIVITIES ARE NOT PREVENTED

## 2025-06-03 ASSESSMENT — PAIN DESCRIPTION - LOCATION: LOCATION: SHOULDER;LEG;ARM

## 2025-06-03 ASSESSMENT — PAIN DESCRIPTION - ORIENTATION: ORIENTATION: RIGHT

## 2025-06-03 NOTE — ED TRIAGE NOTES
PAtient arrived in the ER with c/o left arm, thigh and shoulder pain. Patient states that the symptoms started a couple days ago. Patient also states he is waking up with episodes of sob.

## 2025-06-03 NOTE — ED NOTES
Pt discharged from ED in stable condition. Discharge instruction explain, all question answered.  Pt walked to lobby independently.       Jessie Duarte, RN  06/03/25 [id number]

## 2025-06-03 NOTE — ED PROVIDER NOTES
Comments: Right elbow medial tenderness to palpation   Skin:     General: Skin is warm and dry.   Neurological:      General: No focal deficit present.      Mental Status: He is alert and oriented to person, place, and time.   Psychiatric:         Mood and Affect: Mood normal.                    Luis Amador MD  06/03/25 [id number]

## 2025-06-19 ENCOUNTER — HOSPITAL ENCOUNTER (EMERGENCY)
Age: 30
Discharge: HOME | End: 2025-06-19
Attending: EMERGENCY MEDICINE
Payer: COMMERCIAL

## 2025-06-19 ENCOUNTER — APPOINTMENT (OUTPATIENT)
Dept: GENERAL RADIOLOGY | Age: 30
End: 2025-06-19
Payer: COMMERCIAL

## 2025-06-19 VITALS
RESPIRATION RATE: 20 BRPM | OXYGEN SATURATION: 100 % | HEART RATE: 98 BPM | DIASTOLIC BLOOD PRESSURE: 89 MMHG | HEIGHT: 73 IN | WEIGHT: 282.85 LBS | BODY MASS INDEX: 37.49 KG/M2 | TEMPERATURE: 98.7 F | SYSTOLIC BLOOD PRESSURE: 144 MMHG

## 2025-06-19 DIAGNOSIS — M77.01 GOLFERS ELBOW OF RIGHT UPPER EXTREMITY: Primary | ICD-10-CM

## 2025-06-19 DIAGNOSIS — M67.98: ICD-10-CM

## 2025-06-19 LAB
GLUCOSE BLD-MCNC: 122 MG/DL (ref 70–99)
PERFORMED ON: ABNORMAL

## 2025-06-19 PROCEDURE — 6370000000 HC RX 637 (ALT 250 FOR IP): Performed by: EMERGENCY MEDICINE

## 2025-06-19 PROCEDURE — 73080 X-RAY EXAM OF ELBOW: CPT

## 2025-06-19 PROCEDURE — 99283 EMERGENCY DEPT VISIT LOW MDM: CPT

## 2025-06-19 RX ORDER — MELOXICAM 15 MG/1
15 TABLET ORAL DAILY
Qty: 30 TABLET | Refills: 0 | Status: SHIPPED | OUTPATIENT
Start: 2025-06-19

## 2025-06-19 RX ORDER — IBUPROFEN 800 MG/1
800 TABLET, FILM COATED ORAL ONCE
Status: COMPLETED | OUTPATIENT
Start: 2025-06-19 | End: 2025-06-19

## 2025-06-19 RX ADMIN — IBUPROFEN 800 MG: 800 TABLET, FILM COATED ORAL at 16:09

## 2025-06-19 ASSESSMENT — PAIN DESCRIPTION - DESCRIPTORS: DESCRIPTORS: ACHING

## 2025-06-19 ASSESSMENT — PAIN DESCRIPTION - LOCATION: LOCATION: ARM

## 2025-06-19 ASSESSMENT — PAIN SCALES - GENERAL: PAINLEVEL_OUTOF10: 7

## 2025-06-19 ASSESSMENT — PAIN - FUNCTIONAL ASSESSMENT: PAIN_FUNCTIONAL_ASSESSMENT: 0-10

## 2025-06-19 ASSESSMENT — PAIN DESCRIPTION - ORIENTATION: ORIENTATION: RIGHT

## 2025-06-19 NOTE — ED PROVIDER NOTES
Emergency Department Provider Note  Location: [facility] EMERGENCY DEPARTMENT  6/19/2025     Patient Identification  Julián Flannery is a 29 y.o. male    Chief Complaint  Arm Pain (right)          HPI  (History provided by patient)  Patient is a 29-year-old male with a history of non-insulin-dependent diabetes, HIV on ART, hypertension, presents emergency department with persistent right-sided forearm pain.  Been going on for few weeks now worse over the past few days.  It is worse when he extends his elbow and extends the wrist feels a pulling sensation on the flexor compartment of his forearm.  He denies any known injury.  No numbness or weakness.  It never extends proximal to the elbow.  Radiates towards the medial epicondyle.  No chest pain or exertional symptoms or shortness of breath.  It is not getting better.  He was seen for similar symptoms 2 weeks ago at [facility] in the emergency department and had lab testing and chest x-ray and EKG and was told that he had a tendon injury.  He is slated for follow-up with orthopedics but not until July.  Patient denies any new symptoms denies any fevers.  He is right-hand dominant.  He has been using sports tapes and wraps to provide some relief.  He has not taken any over-the-counter meds      Nursing Notes were all reviewed and agreed with, or any disagreements were addressed in the HPI:  Allergies:   Allergies   Allergen Reactions    Bactrim [Sulfamethoxazole-Trimethoprim]      Skin breaking out in rash       Past medical history:  has a past medical history of Depression, Diabetes mellitus (HCC), HIV (human immunodeficiency virus infection) (HCC), Hypertension, and MRSA (methicillin resistant staph aureus) culture positive (06/06/2018).    Past surgical history:  has a past surgical history that includes [place] tooth extraction.    Home medications:   Prior to Admission medications    Medication Sig Start Date End Date Taking? Authorizing Provider

## 2025-06-19 NOTE — ED TRIAGE NOTES
28y/o male presents to the ED with right arm pain onset around 6/3. He was seen at [facility] ED. Denied any recent injury. No heavy lifting. Pt states pain unrelieved with Tylenol. Pt has appt with ortho for eval.